# Patient Record
Sex: MALE | Race: WHITE | NOT HISPANIC OR LATINO | Employment: OTHER | URBAN - NONMETROPOLITAN AREA
[De-identification: names, ages, dates, MRNs, and addresses within clinical notes are randomized per-mention and may not be internally consistent; named-entity substitution may affect disease eponyms.]

---

## 2023-02-08 PROBLEM — R73.03 PREDIABETES: Status: ACTIVE | Noted: 2023-02-08

## 2023-02-08 PROBLEM — M48.062 SPINAL STENOSIS OF LUMBAR REGION WITH NEUROGENIC CLAUDICATION: Status: ACTIVE | Noted: 2023-02-08

## 2023-02-08 PROBLEM — I49.3 PVC (PREMATURE VENTRICULAR CONTRACTION): Status: ACTIVE | Noted: 2023-02-08

## 2023-02-08 PROBLEM — H81.10 BPPV (BENIGN PAROXYSMAL POSITIONAL VERTIGO): Status: ACTIVE | Noted: 2023-02-08

## 2023-02-08 PROBLEM — N52.9 ERECTILE DYSFUNCTION: Status: ACTIVE | Noted: 2023-02-08

## 2023-02-08 PROBLEM — Z95.1 S/P CABG X 4: Status: ACTIVE | Noted: 2023-02-08

## 2023-02-08 PROBLEM — D12.6 ADENOMATOUS COLON POLYP: Status: ACTIVE | Noted: 2023-02-08

## 2023-02-08 PROBLEM — K21.9 GERD (GASTROESOPHAGEAL REFLUX DISEASE): Status: ACTIVE | Noted: 2023-02-08

## 2023-02-08 PROBLEM — E78.5 HYPERLIPIDEMIA: Status: ACTIVE | Noted: 2023-02-08

## 2023-02-08 PROBLEM — I25.10 CAD (CORONARY ARTERY DISEASE): Status: ACTIVE | Noted: 2023-02-08

## 2023-02-08 PROBLEM — I10 BENIGN ESSENTIAL HYPERTENSION: Status: ACTIVE | Noted: 2023-02-08

## 2023-02-08 PROBLEM — G47.33 OSA ON CPAP: Status: ACTIVE | Noted: 2023-02-08

## 2023-02-08 PROBLEM — M48.02 CERVICAL STENOSIS OF SPINE: Status: ACTIVE | Noted: 2023-02-08

## 2023-02-08 PROBLEM — R05.3 PERSISTENT COUGH: Status: ACTIVE | Noted: 2023-02-08

## 2023-02-08 RX ORDER — ACETAMINOPHEN 325 MG/1
2 TABLET ORAL EVERY 6 HOURS
COMMUNITY

## 2023-02-08 RX ORDER — TURMERIC ROOT EXTRACT 500 MG
TABLET ORAL
COMMUNITY
Start: 2022-09-26

## 2023-02-08 RX ORDER — SILDENAFIL 100 MG/1
.5-1 TABLET, FILM COATED ORAL
COMMUNITY
Start: 2020-01-31 | End: 2023-03-21 | Stop reason: SDUPTHER

## 2023-02-08 RX ORDER — LOSARTAN POTASSIUM 25 MG/1
1 TABLET ORAL DAILY
COMMUNITY
Start: 2020-06-03 | End: 2023-03-15

## 2023-02-08 RX ORDER — CHLORTHALIDONE 25 MG/1
1 TABLET ORAL DAILY
COMMUNITY
Start: 2020-01-31 | End: 2023-03-15

## 2023-02-08 RX ORDER — ROSUVASTATIN CALCIUM 20 MG/1
0.5 TABLET, COATED ORAL DAILY
COMMUNITY
Start: 2019-03-08 | End: 2023-03-15

## 2023-02-08 RX ORDER — NAPROXEN SODIUM 220 MG/1
TABLET ORAL
COMMUNITY

## 2023-02-08 RX ORDER — FAMOTIDINE 40 MG/1
1 TABLET, FILM COATED ORAL DAILY
COMMUNITY
Start: 2014-03-04 | End: 2023-03-15

## 2023-02-08 RX ORDER — ACETAMINOPHEN 160 MG/5ML
SUSPENSION, ORAL (FINAL DOSE FORM) ORAL
COMMUNITY

## 2023-03-15 DIAGNOSIS — E78.2 MIXED HYPERLIPIDEMIA: ICD-10-CM

## 2023-03-15 DIAGNOSIS — I10 BENIGN ESSENTIAL HYPERTENSION: Primary | ICD-10-CM

## 2023-03-15 DIAGNOSIS — K21.9 GASTROESOPHAGEAL REFLUX DISEASE WITHOUT ESOPHAGITIS: ICD-10-CM

## 2023-03-15 RX ORDER — FAMOTIDINE 40 MG/1
TABLET, FILM COATED ORAL
Qty: 90 TABLET | Refills: 3 | Status: SHIPPED | OUTPATIENT
Start: 2023-03-15 | End: 2024-04-02

## 2023-03-15 RX ORDER — LOSARTAN POTASSIUM 25 MG/1
TABLET ORAL
Qty: 90 TABLET | Refills: 3 | Status: SHIPPED | OUTPATIENT
Start: 2023-03-15 | End: 2024-04-02

## 2023-03-15 RX ORDER — CHLORTHALIDONE 25 MG/1
TABLET ORAL
Qty: 90 TABLET | Refills: 3 | Status: SHIPPED | OUTPATIENT
Start: 2023-03-15 | End: 2024-04-02

## 2023-03-15 RX ORDER — ROSUVASTATIN CALCIUM 20 MG/1
TABLET, COATED ORAL
Qty: 45 TABLET | Refills: 3 | Status: SHIPPED | OUTPATIENT
Start: 2023-03-15 | End: 2024-04-02

## 2023-03-21 ENCOUNTER — OFFICE VISIT (OUTPATIENT)
Dept: PRIMARY CARE | Facility: CLINIC | Age: 73
End: 2023-03-21
Payer: MEDICARE

## 2023-03-21 VITALS
HEIGHT: 64 IN | TEMPERATURE: 98.7 F | BODY MASS INDEX: 33.87 KG/M2 | OXYGEN SATURATION: 95 % | DIASTOLIC BLOOD PRESSURE: 77 MMHG | WEIGHT: 198.4 LBS | SYSTOLIC BLOOD PRESSURE: 130 MMHG | RESPIRATION RATE: 12 BRPM | HEART RATE: 79 BPM

## 2023-03-21 DIAGNOSIS — N52.9 ERECTILE DYSFUNCTION, UNSPECIFIED ERECTILE DYSFUNCTION TYPE: ICD-10-CM

## 2023-03-21 DIAGNOSIS — R73.03 PREDIABETES: ICD-10-CM

## 2023-03-21 DIAGNOSIS — G47.33 OSA ON CPAP: ICD-10-CM

## 2023-03-21 DIAGNOSIS — E78.2 MIXED HYPERLIPIDEMIA: ICD-10-CM

## 2023-03-21 DIAGNOSIS — Z00.00 ROUTINE GENERAL MEDICAL EXAMINATION AT HEALTH CARE FACILITY: Primary | ICD-10-CM

## 2023-03-21 DIAGNOSIS — D12.6 ADENOMATOUS POLYP OF COLON, UNSPECIFIED PART OF COLON: ICD-10-CM

## 2023-03-21 DIAGNOSIS — M62.830 SPASM OF LUMBAR PARASPINOUS MUSCLE: ICD-10-CM

## 2023-03-21 DIAGNOSIS — R06.09 DYSPNEA ON EXERTION: ICD-10-CM

## 2023-03-21 DIAGNOSIS — I25.10 CORONARY ARTERY DISEASE INVOLVING NATIVE HEART, UNSPECIFIED VESSEL OR LESION TYPE, UNSPECIFIED WHETHER ANGINA PRESENT: ICD-10-CM

## 2023-03-21 DIAGNOSIS — K21.9 GASTROESOPHAGEAL REFLUX DISEASE WITHOUT ESOPHAGITIS: ICD-10-CM

## 2023-03-21 DIAGNOSIS — I10 BENIGN ESSENTIAL HYPERTENSION: ICD-10-CM

## 2023-03-21 PROBLEM — R05.3 PERSISTENT COUGH: Status: RESOLVED | Noted: 2023-02-08 | Resolved: 2023-03-21

## 2023-03-21 PROBLEM — H81.10 BPPV (BENIGN PAROXYSMAL POSITIONAL VERTIGO): Status: RESOLVED | Noted: 2023-02-08 | Resolved: 2023-03-21

## 2023-03-21 PROCEDURE — 1160F RVW MEDS BY RX/DR IN RCRD: CPT | Performed by: FAMILY MEDICINE

## 2023-03-21 PROCEDURE — 3075F SYST BP GE 130 - 139MM HG: CPT | Performed by: FAMILY MEDICINE

## 2023-03-21 PROCEDURE — 99214 OFFICE O/P EST MOD 30 MIN: CPT | Performed by: FAMILY MEDICINE

## 2023-03-21 PROCEDURE — G0439 PPPS, SUBSEQ VISIT: HCPCS | Performed by: FAMILY MEDICINE

## 2023-03-21 PROCEDURE — 1157F ADVNC CARE PLAN IN RCRD: CPT | Performed by: FAMILY MEDICINE

## 2023-03-21 PROCEDURE — 3078F DIAST BP <80 MM HG: CPT | Performed by: FAMILY MEDICINE

## 2023-03-21 PROCEDURE — 1159F MED LIST DOCD IN RCRD: CPT | Performed by: FAMILY MEDICINE

## 2023-03-21 PROCEDURE — 1036F TOBACCO NON-USER: CPT | Performed by: FAMILY MEDICINE

## 2023-03-21 PROCEDURE — 1170F FXNL STATUS ASSESSED: CPT | Performed by: FAMILY MEDICINE

## 2023-03-21 RX ORDER — SILDENAFIL 100 MG/1
50-100 TABLET, FILM COATED ORAL AS NEEDED
Qty: 10 TABLET | Refills: 3 | Status: SHIPPED | OUTPATIENT
Start: 2023-03-21 | End: 2024-03-21 | Stop reason: SDUPTHER

## 2023-03-21 ASSESSMENT — ACTIVITIES OF DAILY LIVING (ADL)
MANAGING_FINANCES: INDEPENDENT
DOING_HOUSEWORK: INDEPENDENT
GROCERY_SHOPPING: INDEPENDENT
BATHING: INDEPENDENT
DRESSING: INDEPENDENT
TAKING_MEDICATION: INDEPENDENT

## 2023-03-21 ASSESSMENT — PATIENT HEALTH QUESTIONNAIRE - PHQ9
2. FEELING DOWN, DEPRESSED OR HOPELESS: NOT AT ALL
SUM OF ALL RESPONSES TO PHQ9 QUESTIONS 1 AND 2: 0
1. LITTLE INTEREST OR PLEASURE IN DOING THINGS: NOT AT ALL

## 2023-03-21 NOTE — PROGRESS NOTES
"Subjective   Reason for Visit: Troy Degroot is an 72 y.o. male here for a Medicare Wellness visit.      Flu shot-09/20/2022  COVID shot- Pfizer  Tdap-03/04/2016   Colon Screen- 04/01/2019      Reviewed all medications by prescribing practitioner or clinical pharmacist (such as prescriptions, OTCs, herbal therapies and supplements) and documented in the medical record.    HPI  Patient c/o muscle spasms to high lumbar region. Occurs about 2-3 times per year and lasts about 1 week. No identifiable triggers; seems like onset is random. The spasm has been intermittent over the past several years. He states he was seen by PT. The PT thought it may be residual from previous neck surgery. He has been compliant with home exercises but has not found any long term relief. He did find some relief by rolling tennis ball across the back while pressed against the wall.    Patient states he is going to have stress test per Dr. Dougherty this year. Patient endorses gradual decline in exercise ability with OWUSU. No sudden changes. He walks about 2 miles per day.    Patient Care Team:  Cheo Gleason MD as PCP - General  Cheo Gleason MD as PCP - [employer] Advantage PCP     Review of Systems  constitutional: as per HPI  eyes:as per HPI  CV:as per HPI  Respiratory:as per HPI  GI:as per HPI  neuro:as per HPI  endo:as per HPI  heme/lymph:as per HPI     Objective   Vitals:  /77 (BP Location: Right arm, Patient Position: Sitting, BP Cuff Size: Adult)   Pulse 79   Temp 37.1 °C (98.7 °F) (Temporal)   Resp 12   Ht 1.626 m (5' 4\")   Wt 90 kg (198 lb 6.4 oz)   SpO2 95%   BMI 34.06 kg/m²       Physical Exam  Constitutional:       Appearance: Normal appearance.   Cardiovascular:      Rate and Rhythm: Normal rate and regular rhythm.   Pulmonary:      Effort: Pulmonary effort is normal.      Breath sounds: Normal breath sounds.         Assessment/Plan   Problem List Items Addressed This Visit          Nervous    TORREY on CPAP    " Current Assessment & Plan     Stable.  Continue CPAP nightly.            Circulatory    Benign essential hypertension    Current Assessment & Plan     Hypertension:  Blood pressure in office today was   BP Readings from Last 1 Encounters:   03/21/23 130/77   The goal range for blood pressure is below 130/80.   We will continue to monitor.   Continue losartan and chlorthalidone.            Digestive    Adenomatous colon polyp    Current Assessment & Plan     Polyp identified on colonoscopy on 4/2019; high grade dysplasia.  Will continue to rescreen with periodic colonoscopies.         GERD (gastroesophageal reflux disease)    Current Assessment & Plan     Stable  Continue famotidine 40 mg daily.            Endocrine/Metabolic    Prediabetes    Current Assessment & Plan     Most recent A1c:   Hemoglobin A1C (%)   Date Value   09/15/2022 6.1 (A)     You have pre-diabetic level blood sugar.  This means you are at risk for developing diabetes.  ;  I recommend you avoid processed carbohydrates and sugar containing foods/beverages.  If you must have a sweetened beverage, please use Stevia and avoid artificial sweeteners such as aspartame, sucralose, sweet n low, etc.;DRINK WATER!    A good diet plan to follow is the Mediterranean diet.  Use online search to read more about this.;    Another simple rule is to shop the grocery store perimeter -thus filling your cart with fresh fruits, vegetables, lean meats (fish, chicken); dairy, cheese, and nuts.  Avoid the isles where you find bagged and boxed items that are processed. (chips, crackers, cookies, candies, snacks, etc.);    Aerobic cardiovascular exercise 150min weekly is essential for good health, BP control, sugar metabolism, and weight management.;March 21, 2023            Other    Mixed hyperlipidemia    Current Assessment & Plan     Stable  Continue fish oil and rosuvastatin          Other Visit Diagnoses       Routine general medical examination at [facility]  facility    -  Primary    Will wait to have blood work ordered until appt with cardiologist Dr. Dougherty    Spasm of lumbar paraspinous muscle        Infrequent (2-3 one week episodes per year)  Continue rolling tennis ball along the back.   Apply heat  Drink plenty of fluids as dehydration can cause spasms.               By signing my name below I, leon Angeles, attest that this documentation has been prepared under the direction and in the presence of Dr. Cheo Gleason. 03/21/23

## 2023-03-21 NOTE — ASSESSMENT & PLAN NOTE
Hypertension:  Blood pressure in office today was   BP Readings from Last 1 Encounters:   03/21/23 130/77     The goal range for blood pressure is below 130/80.   We will continue to monitor.   Continue losartan and chlorthalidone.

## 2023-03-21 NOTE — ASSESSMENT & PLAN NOTE
Most recent A1c:   Hemoglobin A1C (%)   Date Value   09/15/2022 6.1 (A)       You have pre-diabetic level blood sugar.  This means you are at risk for developing diabetes.  ;  I recommend you avoid processed carbohydrates and sugar containing foods/beverages.  If you must have a sweetened beverage, please use Stevia and avoid artificial sweeteners such as aspartame, sucralose, sweet n low, etc.;DRINK WATER!    A good diet plan to follow is the Mediterranean diet.  Use online search to read more about this.;    Another simple rule is to shop the grocery store perimeter -thus filling your cart with fresh fruits, vegetables, lean meats (fish, chicken); dairy, cheese, and nuts.  Avoid the isles where you find bagged and boxed items that are processed. (chips, crackers, cookies, candies, snacks, etc.);    Aerobic cardiovascular exercise 150min weekly is essential for good health, BP control, sugar metabolism, and weight management.;March 21, 2023

## 2023-03-21 NOTE — PROGRESS NOTES
"Subjective   Reason for Visit: Troy Degroot is an 72 y.o. male here for a Medicare Wellness visit.          Reviewed all medications by prescribing practitioner or clinical pharmacist (such as prescriptions, OTCs, herbal therapies and supplements) and documented in the medical record.    HPI    Patient Care Team:  Cheo Gleason MD as PCP - General  Cheo Gleason MD as PCP - [employer]na Medicare Advantage PCP     Review of Systems    Objective   Vitals:  /77 (BP Location: Right arm, Patient Position: Sitting, BP Cuff Size: Adult)   Pulse 79   Temp 37.1 °C (98.7 °F) (Temporal)   Resp 12   Ht 1.626 m (5' 4\")   Wt 90 kg (198 lb 6.4 oz)   SpO2 95%   BMI 34.06 kg/m²       Physical Exam    Assessment/Plan   Problem List Items Addressed This Visit          Nervous    TORREY on CPAP    Current Assessment & Plan     Stable.  Continue CPAP nightly.         Relevant Orders    Follow Up In [facility] - PCP       Circulatory    Benign essential hypertension    Current Assessment & Plan     Hypertension:  Blood pressure in office today was   BP Readings from Last 1 Encounters:   03/21/23 130/77   The goal range for blood pressure is below 130/80.   We will continue to monitor.   Continue losartan and chlorthalidone.         Relevant Orders    Follow Up [facility] - PCP    CAD (coronary artery disease)    Relevant Medications    sildenafil (Viagra) 100 mg tablet    Other Relevant Orders    Nuclear Stress Test       Digestive    Adenomatous colon polyp    Current Assessment & Plan     Polyp identified on colonoscopy on 4/2019; high grade dysplasia.  Due for repeat scope 2024 as 5 year interval.         GERD (gastroesophageal reflux disease)    Current Assessment & Plan     Stable  Continue famotidine 40 mg daily.         Relevant Orders    Follow Up [facility] - PCP       Genitourinary    Erectile dysfunction    Relevant Medications    sildenafil (Viagra) 100 mg tablet    Other Relevant " Orders    Follow Up [facility] - PCP       Endocrine/Metabolic    Prediabetes    Current Assessment & Plan     Most recent A1c:   Hemoglobin A1C (%)   Date Value   09/15/2022 6.1 (A)     You have pre-diabetic level blood sugar.  This means you are at risk for developing diabetes.  ;  I recommend you avoid processed carbohydrates and sugar containing foods/beverages.  If you must have a sweetened beverage, please use Stevia and avoid artificial sweeteners such as aspartame, sucralose, sweet n low, etc.;DRINK WATER!    A good diet plan to follow is the Mediterranean diet.  Use online search to read more about this.;    Another simple rule is to shop the grocery store perimeter -thus filling your cart with fresh fruits, vegetables, lean meats (fish, chicken); dairy, cheese, and nuts.  Avoid the isles where you find bagged and boxed items that are processed. (chips, crackers, cookies, candies, snacks, etc.);    Aerobic cardiovascular exercise 150min weekly is essential for good health, BP control, sugar metabolism, and weight management.;March 21, 2023         Relevant Orders    Follow Up [facility] - PCP       Other    Mixed hyperlipidemia    Current Assessment & Plan     Stable  Continue fish oil and rosuvastatin         Relevant Orders    Follow Up [facility] - PCP     Other Visit Diagnoses       Routine general medical examination at health care facility    -  Primary    Will wait to have blood work ordered until appt with cardiologist Dr. Dougherty    Spasm of lumbar paraspinous muscle        Infrequent (2-3 one week episodes per year)  Continue rolling tennis ball along the back.   Apply heat  Drink plenty of fluids as dehydration can cause spasms.    Dyspnea on exertion        Due for stress test in 6 months per Dr. Dougherty.  However given symptoms of increased OWUSU, ordered stress test to be done in the near future.    Relevant Orders    Nuclear Stress Test

## 2023-03-21 NOTE — ASSESSMENT & PLAN NOTE
Polyp identified on colonoscopy on 4/2019; high grade dysplasia.  Due for repeat scope 2024 as 5 year interval.

## 2023-08-28 ENCOUNTER — TELEPHONE (OUTPATIENT)
Dept: PRIMARY CARE | Facility: CLINIC | Age: 73
End: 2023-08-28
Payer: MEDICARE

## 2023-08-28 DIAGNOSIS — D12.6 ADENOMATOUS POLYP OF COLON, UNSPECIFIED PART OF COLON: ICD-10-CM

## 2023-08-28 DIAGNOSIS — R73.03 PREDIABETES: ICD-10-CM

## 2023-08-28 DIAGNOSIS — Z12.5 PROSTATE CANCER SCREENING: Primary | ICD-10-CM

## 2023-08-28 DIAGNOSIS — K21.9 GASTROESOPHAGEAL REFLUX DISEASE WITHOUT ESOPHAGITIS: ICD-10-CM

## 2023-08-28 DIAGNOSIS — E78.2 MIXED HYPERLIPIDEMIA: ICD-10-CM

## 2023-08-28 DIAGNOSIS — Z11.59 NEED FOR HEPATITIS C SCREENING TEST: ICD-10-CM

## 2023-08-28 NOTE — TELEPHONE ENCOUNTER
The patient is calling to request his yearly labs be placed to his prior to his appointment coming up.  He would like to have these done with his labs from Dr. Dougherty.

## 2023-08-30 ENCOUNTER — LAB (OUTPATIENT)
Dept: LAB | Facility: LAB | Age: 73
End: 2023-08-30
Payer: MEDICARE

## 2023-08-30 DIAGNOSIS — D12.6 ADENOMATOUS POLYP OF COLON, UNSPECIFIED PART OF COLON: ICD-10-CM

## 2023-08-30 DIAGNOSIS — Z11.59 NEED FOR HEPATITIS C SCREENING TEST: ICD-10-CM

## 2023-08-30 DIAGNOSIS — Z12.5 PROSTATE CANCER SCREENING: ICD-10-CM

## 2023-08-30 DIAGNOSIS — K21.9 GASTROESOPHAGEAL REFLUX DISEASE WITHOUT ESOPHAGITIS: ICD-10-CM

## 2023-08-30 DIAGNOSIS — E78.2 MIXED HYPERLIPIDEMIA: ICD-10-CM

## 2023-08-30 DIAGNOSIS — R73.03 PREDIABETES: ICD-10-CM

## 2023-08-30 LAB
ALANINE AMINOTRANSFERASE (SGPT) (U/L) IN SER/PLAS: 36 U/L (ref 10–52)
ALBUMIN (G/DL) IN SER/PLAS: 4.6 G/DL (ref 3.4–5)
ALKALINE PHOSPHATASE (U/L) IN SER/PLAS: 61 U/L (ref 33–136)
ANION GAP IN SER/PLAS: 16 MMOL/L (ref 10–20)
ASPARTATE AMINOTRANSFERASE (SGOT) (U/L) IN SER/PLAS: 29 U/L (ref 9–39)
BASOPHILS (10*3/UL) IN BLOOD BY AUTOMATED COUNT: 0.07 X10E9/L (ref 0–0.1)
BASOPHILS/100 LEUKOCYTES IN BLOOD BY AUTOMATED COUNT: 0.9 % (ref 0–2)
BILIRUBIN TOTAL (MG/DL) IN SER/PLAS: 0.7 MG/DL (ref 0–1.2)
CALCIUM (MG/DL) IN SER/PLAS: 9.6 MG/DL (ref 8.6–10.6)
CARBON DIOXIDE, TOTAL (MMOL/L) IN SER/PLAS: 29 MMOL/L (ref 21–32)
CHLORIDE (MMOL/L) IN SER/PLAS: 100 MMOL/L (ref 98–107)
CHOLESTEROL (MG/DL) IN SER/PLAS: 133 MG/DL (ref 0–199)
CHOLESTEROL IN HDL (MG/DL) IN SER/PLAS: 55 MG/DL
CHOLESTEROL/HDL RATIO: 2.4
CREATININE (MG/DL) IN SER/PLAS: 1.01 MG/DL (ref 0.5–1.3)
EOSINOPHILS (10*3/UL) IN BLOOD BY AUTOMATED COUNT: 0.27 X10E9/L (ref 0–0.4)
EOSINOPHILS/100 LEUKOCYTES IN BLOOD BY AUTOMATED COUNT: 3.4 % (ref 0–6)
ERYTHROCYTE DISTRIBUTION WIDTH (RATIO) BY AUTOMATED COUNT: 13.3 % (ref 11.5–14.5)
ERYTHROCYTE MEAN CORPUSCULAR HEMOGLOBIN CONCENTRATION (G/DL) BY AUTOMATED: 33.9 G/DL (ref 32–36)
ERYTHROCYTE MEAN CORPUSCULAR VOLUME (FL) BY AUTOMATED COUNT: 93 FL (ref 80–100)
ERYTHROCYTES (10*6/UL) IN BLOOD BY AUTOMATED COUNT: 4.83 X10E12/L (ref 4.5–5.9)
ESTIMATED AVERAGE GLUCOSE FOR HBA1C: 128 MG/DL
GFR MALE: 79 ML/MIN/1.73M2
GLUCOSE (MG/DL) IN SER/PLAS: 105 MG/DL (ref 74–99)
HEMATOCRIT (%) IN BLOOD BY AUTOMATED COUNT: 44.9 % (ref 41–52)
HEMOGLOBIN (G/DL) IN BLOOD: 15.2 G/DL (ref 13.5–17.5)
HEMOGLOBIN A1C/HEMOGLOBIN TOTAL IN BLOOD: 6.1 %
HEPATITIS C VIRUS AB PRESENCE IN SERUM: NONREACTIVE
IMMATURE GRANULOCYTES/100 LEUKOCYTES IN BLOOD BY AUTOMATED COUNT: 0.4 % (ref 0–0.9)
LDL: 56 MG/DL (ref 0–99)
LEUKOCYTES (10*3/UL) IN BLOOD BY AUTOMATED COUNT: 7.8 X10E9/L (ref 4.4–11.3)
LYMPHOCYTES (10*3/UL) IN BLOOD BY AUTOMATED COUNT: 2.07 X10E9/L (ref 0.8–3)
LYMPHOCYTES/100 LEUKOCYTES IN BLOOD BY AUTOMATED COUNT: 26.4 % (ref 13–44)
MONOCYTES (10*3/UL) IN BLOOD BY AUTOMATED COUNT: 0.68 X10E9/L (ref 0.05–0.8)
MONOCYTES/100 LEUKOCYTES IN BLOOD BY AUTOMATED COUNT: 8.7 % (ref 2–10)
NEUTROPHILS (10*3/UL) IN BLOOD BY AUTOMATED COUNT: 4.71 X10E9/L (ref 1.6–5.5)
NEUTROPHILS/100 LEUKOCYTES IN BLOOD BY AUTOMATED COUNT: 60.2 % (ref 40–80)
NRBC (PER 100 WBCS) BY AUTOMATED COUNT: 0 /100 WBC (ref 0–0)
PLATELETS (10*3/UL) IN BLOOD AUTOMATED COUNT: 221 X10E9/L (ref 150–450)
POTASSIUM (MMOL/L) IN SER/PLAS: 3.8 MMOL/L (ref 3.5–5.3)
PROSTATE SPECIFIC ANTIGEN,SCREEN: 0.65 NG/ML (ref 0–4)
PROTEIN TOTAL: 7.1 G/DL (ref 6.4–8.2)
SODIUM (MMOL/L) IN SER/PLAS: 141 MMOL/L (ref 136–145)
TRIGLYCERIDE (MG/DL) IN SER/PLAS: 109 MG/DL (ref 0–149)
UREA NITROGEN (MG/DL) IN SER/PLAS: 19 MG/DL (ref 6–23)
VLDL: 22 MG/DL (ref 0–40)

## 2023-08-30 PROCEDURE — 80061 LIPID PANEL: CPT

## 2023-08-30 PROCEDURE — G0103 PSA SCREENING: HCPCS

## 2023-08-30 PROCEDURE — 83036 HEMOGLOBIN GLYCOSYLATED A1C: CPT

## 2023-08-30 PROCEDURE — 85025 COMPLETE CBC W/AUTO DIFF WBC: CPT

## 2023-08-30 PROCEDURE — 36415 COLL VENOUS BLD VENIPUNCTURE: CPT

## 2023-08-30 PROCEDURE — 86803 HEPATITIS C AB TEST: CPT

## 2023-08-30 PROCEDURE — 80053 COMPREHEN METABOLIC PANEL: CPT

## 2023-09-21 ENCOUNTER — OFFICE VISIT (OUTPATIENT)
Dept: PRIMARY CARE | Facility: CLINIC | Age: 73
End: 2023-09-21
Payer: MEDICARE

## 2023-09-21 VITALS
OXYGEN SATURATION: 93 % | RESPIRATION RATE: 14 BRPM | DIASTOLIC BLOOD PRESSURE: 76 MMHG | SYSTOLIC BLOOD PRESSURE: 125 MMHG | HEART RATE: 76 BPM | TEMPERATURE: 97.1 F | BODY MASS INDEX: 34.16 KG/M2 | WEIGHT: 199 LBS

## 2023-09-21 DIAGNOSIS — N52.9 ERECTILE DYSFUNCTION, UNSPECIFIED ERECTILE DYSFUNCTION TYPE: ICD-10-CM

## 2023-09-21 DIAGNOSIS — G47.33 OSA ON CPAP: ICD-10-CM

## 2023-09-21 DIAGNOSIS — Z95.1 S/P CABG X 4: Primary | ICD-10-CM

## 2023-09-21 DIAGNOSIS — I10 BENIGN ESSENTIAL HYPERTENSION: ICD-10-CM

## 2023-09-21 DIAGNOSIS — K21.9 GASTROESOPHAGEAL REFLUX DISEASE WITHOUT ESOPHAGITIS: ICD-10-CM

## 2023-09-21 DIAGNOSIS — E78.2 MIXED HYPERLIPIDEMIA: ICD-10-CM

## 2023-09-21 DIAGNOSIS — D12.6 ADENOMATOUS POLYP OF COLON, UNSPECIFIED PART OF COLON: ICD-10-CM

## 2023-09-21 DIAGNOSIS — R73.03 PREDIABETES: ICD-10-CM

## 2023-09-21 PROCEDURE — 99214 OFFICE O/P EST MOD 30 MIN: CPT | Performed by: FAMILY MEDICINE

## 2023-09-21 PROCEDURE — 1036F TOBACCO NON-USER: CPT | Performed by: FAMILY MEDICINE

## 2023-09-21 PROCEDURE — 1160F RVW MEDS BY RX/DR IN RCRD: CPT | Performed by: FAMILY MEDICINE

## 2023-09-21 PROCEDURE — 3078F DIAST BP <80 MM HG: CPT | Performed by: FAMILY MEDICINE

## 2023-09-21 PROCEDURE — 1159F MED LIST DOCD IN RCRD: CPT | Performed by: FAMILY MEDICINE

## 2023-09-21 PROCEDURE — 3074F SYST BP LT 130 MM HG: CPT | Performed by: FAMILY MEDICINE

## 2023-09-21 NOTE — PROGRESS NOTES
Subjective     Patient ID: Mao Degroot is a 72 y.o. male who presents for follow up of chronic medical conditions.      Tdap: 03/04/2016  Colonoscopy: 03/29/2019  Flu shot will be done at pharmacy    Started taking Zyrtec due to his sinuses running a lot.  Unsure if he should be taking that or not.      High blood pressure evaluation.     Review of symptoms:  Fatigue:  N   Headaches: N  Blurred vision:  N  Palpitations: N  Chest pains: N  Shortness of Breath: N  Swelling of legs: N  Blood in urine: N  Taking medications daily: Y   Medication side effects: N   Problems with medication compliance:N  Baby Aspirin 81 mg daily: Y     High cholesterol evaluation.     Supplements: Y   specific diet plan:  exercising 30 min daily?:    Fatigue:N  Chest pains:N  Shortness of Breath:N  Sudden Headaches: N  Vision loss: N  Syncope (fainting): N  Leg Claudication (limping/leg tiredness): N   Taking medication daily: Y  Medication side effects:N      May be allergic to dogs  Has congestion and zyrtec helps        Review of Systems    Objective   There were no vitals taken for this visit.  Physical Exam  Constitutional:       Appearance: Normal appearance.   Cardiovascular:      Rate and Rhythm: Normal rate and regular rhythm.      Pulses: Normal pulses.      Heart sounds: Normal heart sounds.   Pulmonary:      Effort: Pulmonary effort is normal.      Breath sounds: Normal breath sounds.   Neurological:      General: No focal deficit present.      Mental Status: He is alert.   Psychiatric:         Mood and Affect: Mood normal.         Behavior: Behavior normal.         Thought Content: Thought content normal.         Judgment: Judgment normal.         Assessment/Plan   Problem List Items Addressed This Visit       Benign essential hypertension    Erectile dysfunction    GERD (gastroesophageal reflux disease)    Mixed hyperlipidemia    TORREY on CPAP    Prediabetes

## 2023-09-25 NOTE — ASSESSMENT & PLAN NOTE
Most recent A1c:   Hemoglobin A1C (%)   Date Value   08/30/2023 6.1 (A)     Weight loss needed    You have pre-diabetic level blood sugar.  This means you are at risk for developing diabetes.  ;  I recommend you avoid processed carbohydrates and sugar containing foods/beverages.  If you must have a sweetened beverage, please use Stevia and avoid artificial sweeteners such as aspartame, sucralose, sweet n low, etc.;DRINK WATER!    A good diet plan to follow is the Mediterranean diet.  Use online search to read more about this.;    Another simple rule is to shop the grocery store perimeter -thus filling your cart with fresh fruits, vegetables, lean meats (fish, chicken); dairy, cheese, and nuts.  Avoid the isles where you find bagged and boxed items that are processed. (chips, crackers, cookies, candies, snacks, etc.);    Aerobic cardiovascular exercise 150min weekly is essential for good health, BP control, sugar metabolism, and weight management.;September 25, 2023

## 2023-09-25 NOTE — ASSESSMENT & PLAN NOTE
Hypertension:  Blood pressure in office today was   BP Readings from Last 1 Encounters:   09/21/23 125/76     The goal range for blood pressure is below 130/80.   We will continue to monitor.   Continue losartan and chlorthalidone.

## 2023-09-25 NOTE — ASSESSMENT & PLAN NOTE
bypass April 2019 consisting of a LIMA to the LAD, SVG to the diagonal, marginal, and PDA.     Monitored by cardiolgy Dr Ladonna Monge  No anginal symptoms

## 2023-11-19 NOTE — PROGRESS NOTES
Subjective   Patient ID: Mao Degroot is a 72 y.o. male who presents for Back Pain (Pt is having muscle spasms in back /Already had the flu shot ).    HPI     PCP- Victorino    The patient presents for evaluation of back pain. Muscle spasms chronic for years.  No new injuries or falls.    Hx C4-7 ACDF with Dr. Dale.  Also had low back fusion about 2 years ago.    Concern today is spasms   Location: right mid to low back   Pain quality: spasms   Severity: 10/10 at worst, now 2/10- worse with movement   Has done PT, hasn't helped much; interested in medical massage   Has used flexeril in past; declines as makes him sleepy    Radiation: none   Timing: intermittent   Duration: 40 years   Previous imaging: xray 2021-  Cervical spine: ACDF C4-C7 without hardware complication.  Lumbar spine: Lumbar spine degenerative changes that are most pronounced at L4-5 and L5-S1.  MRI L spine 2021 stenosis and severe left greater than right neural foraminal narrowing. Degenerative changes are most severe in the lumbar spine at L4-L5 where there is a broad-based disc bulge with the right subarticular zone herniation causing severe narrowing of the central canal within impingement on the traversing L5 nerve roots. There is also severe bilateral neural foraminal narrowing.  Treatments tried include: flexeril, medical marijuana   Denies red flag symptoms including fevers, chills, weight loss, saddle anesthesia, loss of control of bowels/bladder, history of cancer, osteoporosis, severe trauma   No abd pain, urinary, or GI symptoms   No paresthesias or weakness in extremities      Review of Systems   Constitutional:  Negative for chills, fatigue and fever.   Respiratory:  Negative for cough and shortness of breath.    Cardiovascular:  Negative for chest pain and palpitations.       Objective   /79 (BP Location: Left arm, Patient Position: Sitting, BP Cuff Size: Large adult)   Pulse 84   Temp 36.6 °C (97.8 °F) (Temporal)   Resp  16   Wt 91.7 kg (202 lb 1.6 oz)   SpO2 96%   BMI 34.69 kg/m²     Physical Exam    Constitutional: Well developed, well nourished, alert and in no acute distress   Eyes: Normal external exam.   LUMBAR SPINE: Midline scar noted.  No erythema, warmth, or swelling noted. +Right paraspinal muscle spasm. No spinal tenderness. Normal range of motion to flexion, extension, right side bending, left side bending, right rotation, left rotation. Muscle strength +5/5 to hip flexion, knee extension, foot dorsiflexion, foot plantarflexion, and EHL bilaterally (L2-S1). Sensation of lower extremities intact. Negative seated straight leg raise. DTR +1/4. Normal distal pulses.  Skin: Warm, well perfused, normal skin turgor and color.   Neurologic: Cranial nerves II-XII grossly intact.   Psychiatric: Mood calm and affect normal.      Assessment/Plan   Problem List Items Addressed This Visit    None  Visit Diagnoses         Codes    Chronic right-sided low back pain without sciatica    -  Primary M54.50, G89.29    Relevant Orders    Referral to [facility]    Muscle spasm of back     M62.830    Relevant Orders    Referral to [facility]    History of back surgery     Z98.890    Relevant Orders    Referral to [facility]             He declines need for prescription medication.   He is interested in medical massage.  He has already done physical therapy.   Advised him to see medical spine specialist if needed as next step.     Mary Ellen Benton, DO  11/20/2023

## 2023-11-20 ENCOUNTER — OFFICE VISIT (OUTPATIENT)
Dept: PRIMARY CARE | Facility: CLINIC | Age: 73
End: 2023-11-20
Payer: MEDICARE

## 2023-11-20 VITALS
OXYGEN SATURATION: 96 % | DIASTOLIC BLOOD PRESSURE: 79 MMHG | RESPIRATION RATE: 16 BRPM | HEART RATE: 84 BPM | TEMPERATURE: 97.8 F | WEIGHT: 202.1 LBS | BODY MASS INDEX: 34.69 KG/M2 | SYSTOLIC BLOOD PRESSURE: 151 MMHG

## 2023-11-20 DIAGNOSIS — G89.29 CHRONIC RIGHT-SIDED LOW BACK PAIN WITHOUT SCIATICA: Primary | ICD-10-CM

## 2023-11-20 DIAGNOSIS — M54.50 CHRONIC RIGHT-SIDED LOW BACK PAIN WITHOUT SCIATICA: Primary | ICD-10-CM

## 2023-11-20 DIAGNOSIS — M62.830 MUSCLE SPASM OF BACK: ICD-10-CM

## 2023-11-20 DIAGNOSIS — Z98.890 HISTORY OF BACK SURGERY: ICD-10-CM

## 2023-11-20 PROCEDURE — 3078F DIAST BP <80 MM HG: CPT | Performed by: FAMILY MEDICINE

## 2023-11-20 PROCEDURE — 3077F SYST BP >= 140 MM HG: CPT | Performed by: FAMILY MEDICINE

## 2023-11-20 PROCEDURE — 99213 OFFICE O/P EST LOW 20 MIN: CPT | Performed by: FAMILY MEDICINE

## 2023-11-20 PROCEDURE — 1036F TOBACCO NON-USER: CPT | Performed by: FAMILY MEDICINE

## 2023-11-20 PROCEDURE — 1159F MED LIST DOCD IN RCRD: CPT | Performed by: FAMILY MEDICINE

## 2023-11-20 PROCEDURE — 1160F RVW MEDS BY RX/DR IN RCRD: CPT | Performed by: FAMILY MEDICINE

## 2023-11-20 ASSESSMENT — ENCOUNTER SYMPTOMS
FEVER: 0
PALPITATIONS: 0
FATIGUE: 0
SHORTNESS OF BREATH: 0
COUGH: 0
CHILLS: 0

## 2023-11-21 ENCOUNTER — APPOINTMENT (OUTPATIENT)
Dept: INTEGRATIVE MEDICINE | Facility: CLINIC | Age: 73
End: 2023-11-21
Payer: MEDICARE

## 2023-11-24 ENCOUNTER — TELEPHONE (OUTPATIENT)
Dept: PRIMARY CARE | Facility: CLINIC | Age: 73
End: 2023-11-24
Payer: MEDICARE

## 2023-11-24 DIAGNOSIS — G89.29 CHRONIC RIGHT-SIDED LOW BACK PAIN WITHOUT SCIATICA: Primary | ICD-10-CM

## 2023-11-24 DIAGNOSIS — M54.50 CHRONIC RIGHT-SIDED LOW BACK PAIN WITHOUT SCIATICA: Primary | ICD-10-CM

## 2023-11-24 RX ORDER — CYCLOBENZAPRINE HCL 5 MG
5 TABLET ORAL 3 TIMES DAILY PRN
Qty: 30 TABLET | Refills: 0 | Status: SHIPPED | OUTPATIENT
Start: 2023-11-24 | End: 2024-01-23

## 2023-11-24 NOTE — TELEPHONE ENCOUNTER
Patient saw you 11/20/23 for muscle pain    During the visit, he declined muscle relaxer due to them making him sleepy, he was under the impression he would have medical massage not long after appointment    He is unable to get in for the massage for a month and would like flexeril to get him through until then     He uses [facility] in [place]

## 2023-12-24 SDOH — SOCIAL STABILITY: SOCIAL NETWORK: I HAVE TROUBLE DOING ALL OF THE FAMILY ACTIVITIES THAT I WANT TO DO: RARELY

## 2023-12-24 SDOH — SOCIAL STABILITY: SOCIAL NETWORK: I HAVE TROUBLE DOING ALL OF MY USUAL WORK (INCLUDE WORK AT HOME): SOMETIMES

## 2023-12-24 SDOH — SOCIAL STABILITY: SOCIAL NETWORK: I HAVE TROUBLE DOING ALL OF THE ACTIVITIES WITH FRIENDS THAT I WANT TO DO: RARELY

## 2023-12-24 SDOH — SOCIAL STABILITY: SOCIAL NETWORK: I HAVE TROUBLE DOING ALL OF MY REGULAR LEISURE ACTIVITIES WITH OTHERS: SOMETIMES

## 2023-12-24 SDOH — SOCIAL STABILITY: SOCIAL NETWORK: PROMIS ABILITY TO PARTICIPATE IN SOCIAL ROLES & ACTIVITIES T-SCORE: 48

## 2023-12-24 SDOH — SOCIAL STABILITY: SOCIAL NETWORK

## 2023-12-26 ENCOUNTER — ALLIED HEALTH (OUTPATIENT)
Dept: INTEGRATIVE MEDICINE | Facility: CLINIC | Age: 73
End: 2023-12-26

## 2023-12-26 DIAGNOSIS — G89.29 CHRONIC RIGHT-SIDED LOW BACK PAIN WITHOUT SCIATICA: Primary | ICD-10-CM

## 2023-12-26 DIAGNOSIS — M54.50 CHRONIC RIGHT-SIDED LOW BACK PAIN WITHOUT SCIATICA: Primary | ICD-10-CM

## 2023-12-26 DIAGNOSIS — Z98.890 HISTORY OF BACK SURGERY: ICD-10-CM

## 2023-12-26 PROCEDURE — MASSG60 MASSAGE 60 MINUTES: Performed by: MASSAGE THERAPIST

## 2023-12-26 ASSESSMENT — PAIN SCALES - GENERAL: PAINLEVEL_OUTOF10: 0 - NO PAIN

## 2023-12-26 ASSESSMENT — PAIN DESCRIPTION - DESCRIPTORS: DESCRIPTORS: DULL

## 2023-12-26 NOTE — PROGRESS NOTES
Condition of Client (C)   Initial Visit  Patient arrived ambulatory, steady gait, no assistive devices  Identified Patient via two identifiers: name and date of birth  No signs or symptoms of  COVID-19 noted per patient today  Fall Risk: patient did not need assistance to get on or position on the table  Denies allergy to topical lubricant/nuts/seeds/fruits  Reviewed diagnoses, problem list. Medications, platelet level  No contraindications to massage precautions to massage include lighter pressure massage techniques due to history of stenosis of the cervical and lumbar spine, history of cervical fusion, use of Ibuprofen, last Platelet Level: 221 on 08.30.2023  Chief Complaint(S): constant, dull pain in the entire lower back and neck rated 5/10, muscle tension-spasms in the right posterior mid-thoracic region rated 5/10  Exacerbating Factors: lying on floor and not able to get up/sudden movements or bumping into something  Alleviating Factors: muscle tension and pain: thc gummies and cbd/thc cream/physical therapy to the neck/upper body  Patient Goals/Intention: promote comfort and relief of pain/relaxation  Patient Preferences: lighter, firm pressure/swedish massage with therapy on areas of concern  Patient Noted: lower back pain for 40 years, happened at work due to stress, noted spouse rubs his back/he has received one professional massage in the past/experienced 4 spinal surgeries: 2 neck and 2 lower back surgeries/purpose and errol: traveling, trips, and hiking, good relationship with spouse, nice life/constant, dull pain in the entire lower back and neck rated 5/10, the intensity of the pain varies/muscle tension-spasms in the right mid-thoracic region rated 5/10/denied anxiety and stress/denied fatigue, but naps in the afternoon, noted he takes multiple medications/coping rated 10/10/wellbeing rated 10/10     Actions (A)  Provider reviewed plan for the massage session, no contraindications and precautions  include: lighter pressure massage due to diagnoses of lumbar stenosis, history of spinal surgeries to neck (2) and lower back (2), and medications: muscle relaxant, platelets 221 on 08.30,2023 - no new labs documented, and patient has given consent to treat with full understanding of what to expect during the session.  Before massage therapy began, provider explained to the patient to communicate at any time if the pressure was causing discomfort past their tolerance level. Patient agreed to advise therapist.  Massage Pressure Scale: 0-2/5   50 minute session: Gambian Relaxation, upper body massage with focus to areas of concern: neck, mid-thoracic and lower back  Supine Position: lighter pressure Swedish Massage to the head and ears to promote relaxation, posterolateral neck, shoulders and upper back, using the body as counter pressure/light stripping to the pectoralis minor, scalene and levator scapulae muscles  Prone: superficial fascial release to the posterior neck, shoulders, and back with focus to the right midthoracic and lumbar region/lighter pressure stripping and cross-fiber friction to the lumbar region vertical surgical scar (well-healed scar)  Used bolster under knees while supine/bolster under ankles while prone  Used [employer] Advanced Therapy Lotion  Used heated table pad, set to patient's comfort preference  Soft, relaxing music    Response (R)  Patient noted improvement in symptoms: constant, dull pain in the entire lower back rated 0/10 decreased 5 levels and resolved and in the neck rated 2/10, decreased 3 levels/muscle tension-spasms in the right mid-thoracic region rated 3/10, decreased 2 levels/coping rated 10/10, no change in rating/wellbeing rated 10/10 , no change in rating  Patient's Subjective Pressure Rating: comfortable and relaxing  Patient noted a sense of well-being    Evaluation (E):  Patient independent to get off massage table.  Recommendation: 50 minute massage as she can  schedule  Advised that acupuncture treatments would be beneficial - will consider this therapy in the future per his report  Advised that practicing ROM to the postural muscles during the workday help blood circulation and decrease tension and encourage waste products of metabolism to move toward organs of elimination  2.   Advised that self-massage of scalp/ears can promote relaxation and extend the benefits of the massage therapy session  Appointment scheduled for 2023: would like to continue massage therapy  Patient prefers relaxation massage with therapeutic massage to defined areas.  Patient gave permission to send Home Self-Care Practice documents via: [email or web address]  Self-Care Home Practices include: [facility] Guided Imagery links/Gentle Range of Motion and Movement/Habit Stacking/Heart Centered Meditation/Mindful Breathin-2-6 and Anchoring Affirmations/Positional Releases: Scalenes, Levator Scapulae, Upper Trapezius, Splenius, and Quadratus Lumborum/Progressive Relaxation  Provided a business card so patient can contact therapist with any concerns or questions about the treatment protocol

## 2024-01-05 ENCOUNTER — TELEPHONE (OUTPATIENT)
Dept: PRIMARY CARE | Facility: CLINIC | Age: 74
End: 2024-01-05
Payer: MEDICARE

## 2024-01-05 DIAGNOSIS — G89.29 CHRONIC LOW BACK PAIN, UNSPECIFIED BACK PAIN LATERALITY, UNSPECIFIED WHETHER SCIATICA PRESENT: Primary | ICD-10-CM

## 2024-01-05 DIAGNOSIS — M54.50 CHRONIC LOW BACK PAIN, UNSPECIFIED BACK PAIN LATERALITY, UNSPECIFIED WHETHER SCIATICA PRESENT: Primary | ICD-10-CM

## 2024-01-05 RX ORDER — METHYLPREDNISOLONE 4 MG/1
TABLET ORAL
Qty: 21 TABLET | Refills: 0 | Status: SHIPPED | OUTPATIENT
Start: 2024-01-05 | End: 2024-01-12

## 2024-01-05 NOTE — TELEPHONE ENCOUNTER
Patient was seen a few times for lower back pain    States PT did not help, medical massage did not improve symptoms    He is asking if you think prednisone would be helpful again (states he had a similar issue years ago and you prescribed this)     Sending to covering since Dr. Gleason is out of office until Wednesday     Per note from 3/5/2018:  Right-sided low back pain with sciatica    · Start: PredniSONE 5 MG Oral Tablet; as direxcted by written tapering instructions over  next 14days   Rx By: Cheo Gleason; Dispense: 0 Days ; #:100 Tablet; Refill: 0; For: Right-sided low back pain with sciatica; KEVIN = N; Verified Transmission to [employer]; Last Updated By: System, [employer]; 3/5/2018 11:52:08 AM

## 2024-01-06 NOTE — TELEPHONE ENCOUNTER
We can try prednisone, unsure if it will be as helpful since it appears this is a chronic issue - usually works better in more acute (newer) issues with back pain, but okay to try. Will prescribe a Medrol Dose pack - [facility] local pharmacy on file.

## 2024-01-08 NOTE — TELEPHONE ENCOUNTER
Spoke with patient, he picked up script on Saturday,   Feeling a lot better now due to prednisone

## 2024-01-16 ENCOUNTER — ALLIED HEALTH (OUTPATIENT)
Dept: INTEGRATIVE MEDICINE | Facility: CLINIC | Age: 74
End: 2024-01-16

## 2024-01-16 PROCEDURE — MASS60G MASSAGE 60 MINUTES: Performed by: MASSAGE THERAPIST

## 2024-01-16 ASSESSMENT — PAIN DESCRIPTION - DESCRIPTORS: DESCRIPTORS: OTHER (COMMENT)

## 2024-01-16 ASSESSMENT — PAIN SCALES - GENERAL: PAINLEVEL_OUTOF10: 0 - NO PAIN

## 2024-01-17 ENCOUNTER — OFFICE VISIT (OUTPATIENT)
Dept: PRIMARY CARE | Facility: CLINIC | Age: 74
End: 2024-01-17
Payer: MEDICARE

## 2024-01-17 ENCOUNTER — ANCILLARY PROCEDURE (OUTPATIENT)
Dept: RADIOLOGY | Facility: CLINIC | Age: 74
End: 2024-01-17
Payer: MEDICARE

## 2024-01-17 VITALS
WEIGHT: 200.2 LBS | RESPIRATION RATE: 14 BRPM | HEART RATE: 69 BPM | OXYGEN SATURATION: 96 % | BODY MASS INDEX: 34.36 KG/M2 | DIASTOLIC BLOOD PRESSURE: 74 MMHG | TEMPERATURE: 97.7 F | SYSTOLIC BLOOD PRESSURE: 119 MMHG

## 2024-01-17 DIAGNOSIS — M62.830 SPASM OF MUSCLE OF LOWER BACK: Primary | ICD-10-CM

## 2024-01-17 DIAGNOSIS — M62.830 SPASM OF MUSCLE OF LOWER BACK: ICD-10-CM

## 2024-01-17 PROCEDURE — 1160F RVW MEDS BY RX/DR IN RCRD: CPT | Performed by: FAMILY MEDICINE

## 2024-01-17 PROCEDURE — 1036F TOBACCO NON-USER: CPT | Performed by: FAMILY MEDICINE

## 2024-01-17 PROCEDURE — 3074F SYST BP LT 130 MM HG: CPT | Performed by: FAMILY MEDICINE

## 2024-01-17 PROCEDURE — 99213 OFFICE O/P EST LOW 20 MIN: CPT | Performed by: FAMILY MEDICINE

## 2024-01-17 PROCEDURE — 3078F DIAST BP <80 MM HG: CPT | Performed by: FAMILY MEDICINE

## 2024-01-17 PROCEDURE — 1159F MED LIST DOCD IN RCRD: CPT | Performed by: FAMILY MEDICINE

## 2024-01-17 PROCEDURE — 72072 X-RAY EXAM THORAC SPINE 3VWS: CPT | Performed by: RADIOLOGY

## 2024-01-17 PROCEDURE — 72072 X-RAY EXAM THORAC SPINE 3VWS: CPT

## 2024-01-17 PROCEDURE — 1126F AMNT PAIN NOTED NONE PRSNT: CPT | Performed by: FAMILY MEDICINE

## 2024-01-17 ASSESSMENT — ENCOUNTER SYMPTOMS: BACK PAIN: 1

## 2024-01-17 NOTE — ASSESSMENT & PLAN NOTE
Symptoms ongoing for several months, thoracic region.  History of lumbar and cervical back surgeries.  Order for x-ray placed.  Recommend ice, moist heat (20 minutes max), ice again as needed.  If xrays not show concern, will recommend chiropractic treatment and ongoing massotherapy in coordination

## 2024-01-17 NOTE — PROGRESS NOTES
Subjective   Patient ID: Mao Degroot is a 73 y.o. male who presents for Back Pain.    Having spasms and pain on the right side.   Not getting any better.  Had a medical massage done yesterday and was fine until this morning.       He is experiencing muscle spasms in the lower thoracic region of his back. He reports having back problems for decades but the spasms have been going on for several months. He was going to physical therapy but they were focusing more on his neck, not helping his back. Dr. Benton prescribed him a muscle relaxer. He did have a massage yesterday that offered temporary relief. He is not awoken at night. He was instructed by another physician not to see a chiropractor.          Review of Systems   Musculoskeletal:  Positive for back pain.       Objective   /74 (BP Location: Left arm, Patient Position: Sitting, BP Cuff Size: Adult)   Pulse 69   Temp 36.5 °C (97.7 °F) (Temporal)   Resp 14   Wt 90.8 kg (200 lb 3.2 oz)   SpO2 96%   BMI 34.36 kg/m²     Physical Exam  Constitutional:       Appearance: Normal appearance.   Musculoskeletal:      Thoracic back: Spasms and tenderness present. No swelling, edema, deformity, signs of trauma, lacerations or bony tenderness.   Neurological:      General: No focal deficit present.      Mental Status: He is alert.   Psychiatric:         Mood and Affect: Mood normal.         Behavior: Behavior normal.         Thought Content: Thought content normal.         Judgment: Judgment normal.         Assessment/Plan   Problem List Items Addressed This Visit       Spasm of muscle of lower back - Primary     Symptoms ongoing for several months, thoracic region.  History of lumbar and cervical back surgeries.  Order for x-ray placed.  Recommend ice, moist heat (20 minutes max), ice again as needed.  If xrays not show concern, will recommend chiropractic treatment and ongoing massotherapy in coordination           Relevant Orders    Referral to [facility]  [facility]       Scribe Attestation  By signing my name below, I, Sima Luz   attest that this documentation has been prepared under the direction and in the presence of Cheo Gleason MD.

## 2024-01-19 NOTE — PROGRESS NOTES
Assessment/Plan   The patient's right-sided lower back pain and tightness is consistent with myofascial pain.  Facet arthropathy or mild radicular component cannot be ruled out however the patient does not have any concerning signs of radiculopathy given lower extremity strength is intact and he does not have any overt radicular features or radiating symptoms in the lower extremities.  Although he does have prior lumbar spine surgeries there is no hardware placed.  Treatment will involve dry needling and soft tissue manipulation and we can also use spinal manipulation however we will use lower force around the lumbar region given his past surgeries. Patient is also getting massage therapy in our department.    Visits this year: 1    Subjective     HPI - LBP R>L 1/23/24 -patient reports constant right-sided lower back pain and tightness, feels like a spasm and it worsens with transitional movements or exertion.  Has been bothering him for the past 6 to 8 months. Continuously but notes that it first began when he was 36 years old.  Has had 2 back surgeries the most recent of which was a two-level laminectomy at L4-5 and L5-S1.  Surgery went very well.  Prior to the surgery he had lower extremity weakness and numbness and notes that the surgery itself went well.  Before that he had discectomy.  Prior to surgeries he was extremely active going on very long hikes.  Even now he still walks 2 miles per day.  Patient also had remote cervical spine fusion surgery but his neck and upper extremities are not bothering him at all.  Pain is mild at rest 1 out of 10 in intensity but gets moderate to severe at worst    Review of Systems   Constitutional:  Negative for fever.   Eyes:  Negative for visual disturbance.   Respiratory:  Negative for shortness of breath.    Cardiovascular:  Negative for chest pain.   Gastrointestinal:  Negative for diarrhea, nausea and vomiting.   Genitourinary:  Negative for difficulty urinating and  dysuria.   Skin:  Negative for color change.   Neurological:  Negative for dizziness.   Psychiatric/Behavioral:  Negative for agitation.    All other systems reviewed and are negative.    Objective   Examination findings (e.g., palpation & ROM): Dec LS ROM & HTN/tender R LS erectors & QL  BL SLR 45*     Segmental joint dysfunction was identified in the following areas using motion palpation and/or pain provocation assessment:  Cervical:   Thoracic: 5-8  Lumbopelvic: 1, BL SIJ      Physical Exam  Neurological:      General: No focal deficit present.      Mental Status: He is alert.      Cranial Nerves: No dysarthria or facial asymmetry.      Sensory: Sensation is intact.      Motor: Motor function is intact. No tremor.      Coordination: Coordination is intact.      Gait: Gait is intact.      Deep Tendon Reflexes:      Reflex Scores:       Patellar reflexes are 1+ on the right side and 1+ on the left side.       Achilles reflexes are 1+ on the right side and 1+ on the left side.     Comments: -ve Doan's sign BL       Plan   Today's treatment:  Dry needling (10 in, 10 out) to region of the chief complaint / hypertonic muscles identified upon palpation in R LS erectors & QL  Manual dynamic stretching of the gluteal muscles, hamstrings BL 6m  Patient noted reduced pain and improved mobility post-treatment    Treatment Plan:   The patient and I discussed the risks and benefits of chiropractic care. Based on the patient's subjective complaints along with the examination findings, it is advised that a course of chiropractic treatment by initiated. The patient provided consent for care. The patient tolerated today's treatment with little or no additional discomfort and was instructed to contact the office for questions or concerns. Will see patient once per week then every 2 weeks when symptoms become mild/manageable, further spaced apart contingent upon improvement.     This chart note was generated using dictation  software, and as such, there may be typographical errors present. Abbreviations: Cervical spine (CS), cervical-thoracic (CT), Dry needling (DN), Flexion adduction internal rotation (FAIR), high velocity, low amplitude (HVLA), Lumbar spine (LS), Soft tissue manipulation (STM), spinal manipulative therapy (SMT), Straight leg raise (SLR), Thoracic spine (TS).

## 2024-01-22 SDOH — SOCIAL STABILITY: SOCIAL NETWORK: PROMIS ABILITY TO PARTICIPATE IN SOCIAL ROLES & ACTIVITIES T-SCORE: 54

## 2024-01-22 SDOH — SOCIAL STABILITY: SOCIAL NETWORK: I HAVE TROUBLE DOING ALL OF THE ACTIVITIES WITH FRIENDS THAT I WANT TO DO: RARELY

## 2024-01-22 SDOH — SOCIAL STABILITY: SOCIAL NETWORK: I HAVE TROUBLE DOING ALL OF MY REGULAR LEISURE ACTIVITIES WITH OTHERS: NEVER

## 2024-01-22 SDOH — SOCIAL STABILITY: SOCIAL NETWORK

## 2024-01-22 SDOH — SOCIAL STABILITY: SOCIAL NETWORK: I HAVE TROUBLE DOING ALL OF THE FAMILY ACTIVITIES THAT I WANT TO DO: NEVER

## 2024-01-22 SDOH — SOCIAL STABILITY: SOCIAL NETWORK: I HAVE TROUBLE DOING ALL OF MY USUAL WORK (INCLUDE WORK AT HOME): SOMETIMES

## 2024-01-22 NOTE — PROGRESS NOTES
Condition of Client (C)   Follow Up Visit 1  Patient arrived ambulatory, steady gait, no assistive devices  Identified Patient via two identifiers: name and date of birth  No signs or symptoms of  COVID-19 noted per patient today  Fall Risk: patient did not need assistance to get on or position on the table  Denies allergy to topical lubricant/nuts/seeds/fruits  Reviewed diagnoses, problem list. Medications, platelet level/initial visit note, and client interview  No contraindications to massage precautions to massage include lighter pressure massage techniques due to history of stenosis of the cervical and lumbar spine, history of cervical fusion, use of Ibuprofen, last Platelet Level: 221 on 08.30.2023 (no new lab results)  Chief Complaint(S): intermittent, grabbing pain in the right posterior  mid-thoracic region rated 10/10  with muscle tension (last session constant dull pain in the entire lower back and neck  rated 5/10 and muscle tension-spasms in the right posterior mid-thoracic region rated 5/10)  Exacerbating Factors: lying on floor and not able to get up/sudden movements or getting up quickly lead to a grabbing pain, bumping into something  Alleviating Factors: muscle tension and pain: thc gummies and cbd/thc cream/physical therapy to the neck/upper body, temporary relief with Prednisone and muscle relaxer  Patient Goals/Intention: promote comfort and relief of pain/relaxation  Patient Preferences: lighter, firm pressure/swedish massage with therapy on areas of concern  Patient Noted: intermittent, grabbing pain in the right posterior mid-thoracic region rated 10/10  with muscle tension (last session constant dull pain in the entire lower back and neck  rated 5/10  and muscle tension-spasms in the right posterior mid-thoracic region rated 5/10)/anxiety and stress rated 0/10 (last session rated 0/10), denied fatigue (last session rated 0/10), coping rated 9/10 (rated 10/10 last session), and wellbeing  rated 9/10 (rated 10/10 last session)/benefits lasted throughout the day, but in the evening he moved and the movement exacerbated the pain and muscle tension/prescribed Prednisone for 6 days/9 taper) and it worked well, until he weaned down and then the pain and muscle tension returned; the relief was temporary with the Prednisone and the muscle relaxer promoted sleepiness  Patient Noted on 12.26.2023: lower back pain for 40 years, happened at work due to stress, noted spouse rubs his back/he has received one professional massage in the past/experienced 4 spinal surgeries: 2 neck and 2 lower back surgeries/purpose and errol: traveling, trips, and hiking, good relationship with spouse, nice life/constant, dull pain in the entire lower back and neck rated 5/10, the intensity of the pain varies/muscle tension-spasms in the right mid-thoracic region rated 5/10/denied anxiety and stress/denied fatigue, but naps in the afternoon, noted he takes multiple medications/coping rated 10/10/wellbeing rated 10/10     Actions (A)  Provider reviewed plan for the massage session, no contraindications and precautions include: lighter pressure massage due to diagnoses of lumbar stenosis, history of spinal surgeries to neck (2) and lower back (2), and medications: muscle relaxant, platelets 221 on 08.30,2023 - no new labs documented, and patient has given consent to treat with full understanding of what to expect during the session.  Before massage therapy began, provider explained to the patient to communicate at any time if the pressure was causing discomfort past their tolerance level. Patient agreed to advise therapist.  Massage Pressure Scale: 0-2/5   50 minute session: Tamazight Relaxation, upper body massage with focus to areas of concern: right posterior mid-thoracic region  Supine Position: lighter pressure Swedish Massage to the head and ears to promote relaxation, posterolateral neck, shoulders and upper back, using the body as  counter pressure/light stripping to the pectoralis minor muscles  Prone: superficial fascial release to the posterior neck, shoulders, and back with focus to the right midthoracic region: lighter stripping intercostals 6-12, erector spinae muscles/lighter pressure stripping and cross-fiber friction to the lumbar region vertical surgical scar (well-healed scar)  Used bolster under knees while supine/bolster under ankles while prone  Used Biotone Advanced Therapy Lotion  Used heated table pad, set to patient's comfort preference  Soft, relaxing music    Response (R)  Patient noted improvement in symptoms: intermittent, grabbing pain in the right posterior mid-thoracic region rated 0/10 , decreased 10 levels and resolved and only feels tension with movement/anxiety and stress rated 0/10, rating did not change, fatigue 0/10, rating did not change, coping rated 9/10, rating did not change, wellbeing 9/10, rating did not change  Patient's Subjective Pressure Rating: comfortable and relaxing  Patient noted a sense of well-being    Evaluation (E):  Patient independent to get off massage table.  Recommendation: 50 minute massage as he can schedule  Advised that acupuncture or chiropractic treatments would be beneficial - will consider this therapy after he meets with his PCP on 01.17.2024  Advised that practicing ROM to the postural muscles during the workday help blood circulation and decrease tension and encourage waste products of metabolism to move toward organs of elimination  2.   Advised that self-massage of scalp/ears can promote relaxation and extend the benefits of the massage therapy session  Appointment to be scheduled, would like to continue massage therapy: listed on waiting list  Patient prefers relaxation massage with therapeutic massage to defined areas.  Patient gave permission to send Home Self-Care Practice documents via: [email or web address]  Self-Care Home Practices include: [facility] [employer] Guided  Imagery links/Gentle Range of Motion and Movement/Habit Stacking/Heart Centered Meditation/Mindful Breathin-2-6 and Anchoring Affirmations/Positional Releases: Scalenes, Levator Scapulae, Upper Trapezius, Splenius, and Quadratus Lumborum/Progressive Relaxation  Provided a business card so patient can contact therapist with any concerns or questions about the treatment protocol

## 2024-01-23 ENCOUNTER — ALLIED HEALTH (OUTPATIENT)
Dept: INTEGRATIVE MEDICINE | Facility: CLINIC | Age: 74
End: 2024-01-23
Payer: MEDICARE

## 2024-01-23 DIAGNOSIS — M54.50 CHRONIC RIGHT-SIDED LOW BACK PAIN WITHOUT SCIATICA: ICD-10-CM

## 2024-01-23 DIAGNOSIS — G89.29 CHRONIC RIGHT-SIDED LOW BACK PAIN WITHOUT SCIATICA: ICD-10-CM

## 2024-01-23 DIAGNOSIS — M79.10 MYALGIA: ICD-10-CM

## 2024-01-23 DIAGNOSIS — M99.05 SOMATIC DYSFUNCTION OF PELVIS REGION: ICD-10-CM

## 2024-01-23 DIAGNOSIS — M99.02 SOMATIC DYSFUNCTION OF THORACIC REGION: Primary | ICD-10-CM

## 2024-01-23 DIAGNOSIS — M99.03 SOMATIC DYSFUNCTION OF LUMBAR REGION: ICD-10-CM

## 2024-01-23 PROCEDURE — 99203 OFFICE O/P NEW LOW 30 MIN: CPT | Performed by: CHIROPRACTOR

## 2024-01-23 ASSESSMENT — ENCOUNTER SYMPTOMS
COLOR CHANGE: 0
SHORTNESS OF BREATH: 0
FEVER: 0
NAUSEA: 0
DIZZINESS: 0
AGITATION: 0
VOMITING: 0
DIARRHEA: 0
DYSURIA: 0
DIFFICULTY URINATING: 0

## 2024-01-26 ASSESSMENT — ENCOUNTER SYMPTOMS
NAUSEA: 0
DIZZINESS: 0
AGITATION: 0
FEVER: 0
DYSURIA: 0
DIFFICULTY URINATING: 0
COLOR CHANGE: 0
DIARRHEA: 0
VOMITING: 0
SHORTNESS OF BREATH: 0

## 2024-01-26 NOTE — PROGRESS NOTES
Assessment/Plan   The patient's right-sided lower back pain and tightness is consistent with myofascial pain.  Facet arthropathy or mild radicular component cannot be ruled out however the patient does not have any concerning signs of radiculopathy given lower extremity strength is intact and he does not have any overt radicular features or radiating symptoms in the lower extremities.  Although he does have prior lumbar spine surgeries there is no hardware placed.  Treatment will involve dry needling and soft tissue manipulation and we can also use spinal manipulation however we will use lower force around the lumbar region given his past surgeries. Patient is also getting massage therapy in our department.    Visits this year: 2    Subjective   Patient notes significant improvement since his first visit noting that his right-sided lower back spasm and pain has subsided in intensity and frequency.  Felt immediate response to treatment noting reduction in spasm and currently symptoms are mild.  Has been walking more as well noting that he is going for 2 walks per day instead of 1    HPI - LBP R>L 1/23/24 -patient reports constant right-sided lower back pain and tightness, feels like a spasm and it worsens with transitional movements or exertion.  Has been bothering him for the past 6 to 8 months. Continuously but notes that it first began when he was 36 years old.  Has had 2 back surgeries the most recent of which was a two-level laminectomy at L4-5 and L5-S1.  Surgery went very well.  Prior to the surgery he had lower extremity weakness and numbness and notes that the surgery itself went well.  Before that he had discectomy.  Prior to surgeries he was extremely active going on very long hikes.  Even now he still walks 2 miles per day.  Patient also had remote cervical spine fusion surgery but his neck and upper extremities are not bothering him at all.  Pain is mild at rest 1 out of 10 in intensity but gets moderate  to severe at worst    Review of Systems   Constitutional:  Negative for fever.   Eyes:  Negative for visual disturbance.   Respiratory:  Negative for shortness of breath.    Cardiovascular:  Negative for chest pain.   Gastrointestinal:  Negative for diarrhea, nausea and vomiting.   Genitourinary:  Negative for difficulty urinating and dysuria.   Skin:  Negative for color change.   Neurological:  Negative for dizziness.   Psychiatric/Behavioral:  Negative for agitation.    All other systems reviewed and are negative.    Objective   Examination findings (e.g., palpation & ROM): Dec LS ROM & HTN/tender R LS erectors & QL  BL SLR 45*  -ve FAIR BL     Segmental joint dysfunction was identified in the following areas using motion palpation and/or pain provocation assessment:  Cervical:   Thoracic: 5-7  Lumbopelvic: 1, BL SIJ      Physical Exam  Neurological:      General: No focal deficit present.      Mental Status: He is alert.      Cranial Nerves: No dysarthria or facial asymmetry.      Sensory: Sensation is intact.      Motor: Motor function is intact. No tremor.      Coordination: Coordination is intact.      Gait: Gait is intact.      Deep Tendon Reflexes:      Reflex Scores:       Patellar reflexes are 1+ on the right side and 1+ on the left side.       Achilles reflexes are 1+ on the right side and 1+ on the left side.     Comments: -ve Doan's sign BL       Plan   Today's treatment:  Dry needling (10 in, 10 out) to region of the chief complaint / hypertonic muscles identified upon palpation in R LS erectors & QL  Manual dynamic stretching of the gluteal muscles, hamstrings BL 6m  Patient noted reduced pain and improved mobility post-treatment    Treatment Plan:   The patient and I discussed the risks and benefits of chiropractic care. Based on the patient's subjective complaints along with the examination findings, it is advised that a course of chiropractic treatment by initiated. The patient provided consent  for care. The patient tolerated today's treatment with little or no additional discomfort and was instructed to contact the office for questions or concerns. Will see patient once per week then every 2 weeks when symptoms become mild/manageable, further spaced apart contingent upon improvement.     This chart note was generated using dictation software, and as such, there may be typographical errors present. Abbreviations: Cervical spine (CS), cervical-thoracic (CT), Dry needling (DN), Flexion adduction internal rotation (FAIR), high velocity, low amplitude (HVLA), Lumbar spine (LS), Soft tissue manipulation (STM), spinal manipulative therapy (SMT), Straight leg raise (SLR), Thoracic spine (TS).

## 2024-01-30 ENCOUNTER — ALLIED HEALTH (OUTPATIENT)
Dept: INTEGRATIVE MEDICINE | Facility: CLINIC | Age: 74
End: 2024-01-30
Payer: MEDICARE

## 2024-01-30 DIAGNOSIS — M79.10 MYALGIA: ICD-10-CM

## 2024-01-30 DIAGNOSIS — M99.02 SOMATIC DYSFUNCTION OF THORACIC REGION: Primary | ICD-10-CM

## 2024-01-30 DIAGNOSIS — M99.05 SOMATIC DYSFUNCTION OF PELVIS REGION: ICD-10-CM

## 2024-01-30 DIAGNOSIS — M99.03 SOMATIC DYSFUNCTION OF LUMBAR REGION: ICD-10-CM

## 2024-01-30 DIAGNOSIS — G89.29 CHRONIC RIGHT-SIDED LOW BACK PAIN WITHOUT SCIATICA: ICD-10-CM

## 2024-01-30 DIAGNOSIS — M54.50 CHRONIC RIGHT-SIDED LOW BACK PAIN WITHOUT SCIATICA: ICD-10-CM

## 2024-01-30 PROCEDURE — 98941 CHIROPRACT MANJ 3-4 REGIONS: CPT | Performed by: CHIROPRACTOR

## 2024-01-31 ENCOUNTER — APPOINTMENT (OUTPATIENT)
Dept: PRIMARY CARE | Facility: CLINIC | Age: 74
End: 2024-01-31
Payer: MEDICARE

## 2024-02-02 ASSESSMENT — ENCOUNTER SYMPTOMS
DIFFICULTY URINATING: 0
DIZZINESS: 0
DIARRHEA: 0
FEVER: 0
NAUSEA: 0
DYSURIA: 0
AGITATION: 0
VOMITING: 0
SHORTNESS OF BREATH: 0
COLOR CHANGE: 0

## 2024-02-02 NOTE — PROGRESS NOTES
Assessment/Plan   The patient's right-sided lower back pain and tightness is consistent with myofascial pain.  Facet arthropathy or mild radicular component cannot be ruled out however the patient does not have any concerning signs of radiculopathy given lower extremity strength is intact and he does not have any overt radicular features or radiating symptoms in the lower extremities.  Although he does have prior lumbar spine surgeries there is no hardware placed.  Treatment will involve dry needling and soft tissue manipulation and we can also use spinal manipulation however we will use lower force around the lumbar region given his past surgeries. Patient is also getting massage therapy in our department.    Visits this year: 3    Subjective   Patient reports that he is doing much better with only mild intermittent thoracolumbar and right-sided low back pain and tightness.  Has been walking more regularly, walking his dogs.  Less increases in pain with transitional movements such as rolling over or standing up from sitting.    HPI - LBP R>L 1/23/24 -patient reports constant right-sided lower back pain and tightness, feels like a spasm and it worsens with transitional movements or exertion.  Has been bothering him for the past 6 to 8 months. Continuously but notes that it first began when he was 36 years old.  Has had 2 back surgeries the most recent of which was a two-level laminectomy at L4-5 and L5-S1.  Surgery went very well.  Prior to the surgery he had lower extremity weakness and numbness and notes that the surgery itself went well.  Before that he had discectomy.  Prior to surgeries he was extremely active going on very long hikes.  Even now he still walks 2 miles per day.  Patient also had remote cervical spine fusion surgery but his neck and upper extremities are not bothering him at all.  Pain is mild at rest 1 out of 10 in intensity but gets moderate to severe at worst    Review of Systems    Constitutional:  Negative for fever.   Eyes:  Negative for visual disturbance.   Respiratory:  Negative for shortness of breath.    Cardiovascular:  Negative for chest pain.   Gastrointestinal:  Negative for diarrhea, nausea and vomiting.   Genitourinary:  Negative for difficulty urinating and dysuria.   Skin:  Negative for color change.   Neurological:  Negative for dizziness.   Psychiatric/Behavioral:  Negative for agitation.    All other systems reviewed and are negative.    Objective   Examination findings (e.g., palpation & ROM): Dec LS ROM & HTN/tender R LS erectors & QL  BL SLR 50  -ve FAIR BL     Segmental joint dysfunction was identified in the following areas using motion palpation and/or pain provocation assessment:  Cervical:   Thoracic: 5-8  Lumbopelvic: 1, BL SIJ      Physical Exam  Neurological:      General: No focal deficit present.      Mental Status: He is alert.      Cranial Nerves: No dysarthria or facial asymmetry.      Sensory: Sensation is intact.      Motor: Motor function is intact. No tremor.      Coordination: Coordination is intact.      Gait: Gait is intact.      Deep Tendon Reflexes:      Reflex Scores:       Patellar reflexes are 1+ on the right side and 1+ on the left side.       Achilles reflexes are 1+ on the right side and 1+ on the left side.     Comments: -ve Doan's sign BL       Plan   Today's treatment:  Dry needling (10 in, 10 out) to region of the chief complaint / hypertonic muscles identified upon palpation in R LS erectors & QL  Manual dynamic stretching of the gluteal muscles, hamstrings BL 6m  Patient noted reduced pain and improved mobility post-treatment    Treatment Plan:   The patient and I discussed the risks and benefits of chiropractic care. Based on the patient's subjective complaints along with the examination findings, it is advised that a course of chiropractic treatment by initiated. The patient provided consent for care. The patient tolerated today's  treatment with little or no additional discomfort and was instructed to contact the office for questions or concerns. Will see patient once per week then every 2 weeks when symptoms become mild/manageable, further spaced apart contingent upon improvement.     This chart note was generated using dictation software, and as such, there may be typographical errors present. Abbreviations: Cervical spine (CS), cervical-thoracic (CT), Dry needling (DN), Flexion adduction internal rotation (FAIR), high velocity, low amplitude (HVLA), Lumbar spine (LS), Soft tissue manipulation (STM), spinal manipulative therapy (SMT), Straight leg raise (SLR), Thoracic spine (TS).

## 2024-02-02 NOTE — PROGRESS NOTES
Assessment/Plan   The patient's right-sided lower back pain and tightness is consistent with myofascial pain.  Facet arthropathy or mild radicular component cannot be ruled out however the patient does not have any concerning signs of radiculopathy given lower extremity strength is intact and he does not have any overt radicular features or radiating symptoms in the lower extremities.  Although he does have prior lumbar spine surgeries there is no hardware placed.  Treatment will involve dry needling and soft tissue manipulation and we can also use spinal manipulation however we will use lower force around the lumbar region given his past surgeries. Patient is also getting massage therapy in our department.    Visits this year: 3    Subjective   Patient reports continued improvement in lower back pain noting that symptoms are mild and intermittent and he only has occasional episodes of moderate pain which are very brief and transient in nature for example when rolling over in bed.  He feels that he is almost back to his usual self and is walking his dogs twice per day with only slight fatigue in the legs after walking too long.    HPI - LBP R>L 1/23/24 -patient reports constant right-sided lower back pain and tightness, feels like a spasm and it worsens with transitional movements or exertion.  Has been bothering him for the past 6 to 8 months. Continuously but notes that it first began when he was 36 years old.  Has had 2 back surgeries the most recent of which was a two-level laminectomy at L4-5 and L5-S1.  Surgery went very well.  Prior to the surgery he had lower extremity weakness and numbness and notes that the surgery itself went well.  Before that he had discectomy.  Prior to surgeries he was extremely active going on very long hikes.  Even now he still walks 2 miles per day.  Patient also had remote cervical spine fusion surgery but his neck and upper extremities are not bothering him at all.  Pain is mild  at rest 1 out of 10 in intensity but gets moderate to severe at worst    Review of Systems   Constitutional:  Negative for fever.   Eyes:  Negative for visual disturbance.   Respiratory:  Negative for shortness of breath.    Cardiovascular:  Negative for chest pain.   Gastrointestinal:  Negative for diarrhea, nausea and vomiting.   Genitourinary:  Negative for difficulty urinating and dysuria.   Skin:  Negative for color change.   Neurological:  Negative for dizziness.   Psychiatric/Behavioral:  Negative for agitation.    All other systems reviewed and are negative.    Objective   Examination findings (e.g., palpation & ROM): Dec LS ROM & HTN/tender R LS erectors & QL  BL SLR 50*  -ve FAIR BL     Segmental joint dysfunction was identified in the following areas using motion palpation and/or pain provocation assessment:  Cervical:   Thoracic: 5-7  Lumbopelvic: 1, BL SIJ      Physical Exam  Neurological:      General: No focal deficit present.      Mental Status: He is alert.      Cranial Nerves: No dysarthria or facial asymmetry.      Sensory: Sensation is intact.      Motor: Motor function is intact. No tremor.      Coordination: Coordination is intact.      Gait: Gait is intact.      Deep Tendon Reflexes:      Reflex Scores:       Patellar reflexes are 1+ on the right side and 1+ on the left side.       Achilles reflexes are 1+ on the right side and 1+ on the left side.     Comments: -ve Doan's sign BL       Plan   Today's treatment:  Dry needling (10 in, 10 out) to region of the chief complaint / hypertonic muscles identified upon palpation in R LS erectors & QL  Manual dynamic stretching of the gluteal muscles, hamstrings BL 6m  Patient noted reduced pain and improved mobility post-treatment    Treatment Plan:   The patient and I discussed the risks and benefits of chiropractic care. Based on the patient's subjective complaints along with the examination findings, it is advised that a course of chiropractic  treatment by initiated. The patient provided consent for care. The patient tolerated today's treatment with little or no additional discomfort and was instructed to contact the office for questions or concerns. Will see patient once per week then every 2 weeks when symptoms become mild/manageable, further spaced apart contingent upon improvement.     This chart note was generated using dictation software, and as such, there may be typographical errors present. Abbreviations: Cervical spine (CS), cervical-thoracic (CT), Dry needling (DN), Flexion adduction internal rotation (FAIR), high velocity, low amplitude (HVLA), Lumbar spine (LS), Soft tissue manipulation (STM), spinal manipulative therapy (SMT), Straight leg raise (SLR), Thoracic spine (TS).

## 2024-02-06 ENCOUNTER — ALLIED HEALTH (OUTPATIENT)
Dept: INTEGRATIVE MEDICINE | Facility: CLINIC | Age: 74
End: 2024-02-06

## 2024-02-06 ENCOUNTER — ALLIED HEALTH (OUTPATIENT)
Dept: INTEGRATIVE MEDICINE | Facility: CLINIC | Age: 74
End: 2024-02-06
Payer: MEDICARE

## 2024-02-06 DIAGNOSIS — M99.02 SOMATIC DYSFUNCTION OF THORACIC REGION: Primary | ICD-10-CM

## 2024-02-06 DIAGNOSIS — M54.50 CHRONIC RIGHT-SIDED LOW BACK PAIN WITHOUT SCIATICA: ICD-10-CM

## 2024-02-06 DIAGNOSIS — M99.03 SOMATIC DYSFUNCTION OF LUMBAR REGION: ICD-10-CM

## 2024-02-06 DIAGNOSIS — G89.29 CHRONIC RIGHT-SIDED LOW BACK PAIN WITHOUT SCIATICA: ICD-10-CM

## 2024-02-06 DIAGNOSIS — M79.10 MYALGIA: ICD-10-CM

## 2024-02-06 DIAGNOSIS — M99.05 SOMATIC DYSFUNCTION OF PELVIS REGION: ICD-10-CM

## 2024-02-06 PROCEDURE — 98941 CHIROPRACT MANJ 3-4 REGIONS: CPT | Performed by: CHIROPRACTOR

## 2024-02-06 PROCEDURE — MASSG60 MASSAGE 60 MINUTES: Performed by: MASSAGE THERAPIST

## 2024-02-06 ASSESSMENT — PAIN DESCRIPTION - DESCRIPTORS: DESCRIPTORS: OTHER (COMMENT)

## 2024-02-06 ASSESSMENT — PAIN SCALES - GENERAL: PAINLEVEL_OUTOF10: 0 - NO PAIN

## 2024-02-06 NOTE — PROGRESS NOTES
Condition of Client (C)   Follow Up Visit 2  Patient arrived ambulatory, steady gait, no assistive devices  Identified Patient via two identifiers: name and date of birth  No signs or symptoms of  COVID-19 noted per patient today  Fall Risk: patient did not need assistance to get on or position on the table  Denies allergy to topical lubricant/nuts/seeds/fruits  Reviewed diagnoses, problem list. Medications, platelet level/client interview/follow up visit 1 note  No contraindications to massage/precautions to massage include lighter pressure massage techniques due to history of stenosis of the cervical and lumbar spine, history of cervical fusion, use of Ibuprofen, last Platelet Level: 221 on 08.30.2023 (no new lab results noted)  Chief Complaint(S): intermittent, grabbing pain  with movement in the right posterior mid-thoracic region and lateral torso rated 2/10  with muscle tension (last session rated 10/10), much improved  Exacerbating Factors: lying on floor and not able to get up/sudden movements or getting up quickly lead to a grabbing pain, bumping into something  Alleviating Factors: muscle tension and pain: thc gummies and cbd/thc cream/physical therapy to the neck/upper body, temporary relief with Prednisone and muscle relaxer/massage therapy, chiropractic  Patient Goals/Intention: promote comfort and relief of pain/relaxation  Patient Preferences: lighter, firm pressure/swedish massage with therapy on areas of concern  Patient Noted: received chiropractic care with Dr. Dumont and felt dry needling is effective/still feels pain, but it is very minor and much improved; can deal with this pain experience/felt a huge difference after the last massage therapy treatment//the pain he was experiencing with that high intensity, was something he noted he could not deal with long term/he is administering less medication, no muscle relaxer, and using some topical cbd oil/salve and a 1/2 gummy/noticing he is being  more active/he is planning to walk in [place], again, walking dog more often/talked about his last trip to [facility] and about the animals and nature/intermittent, grabbing pain  with movement in the right posterior mid-thoracic region and lateral torso rated 2/10  with muscle tension (last session rated 10/10, much improved)/anxiety and stress rated 0/10 (last session rated 0/10), fatigue rated 0/10 (last session rated 0/10), coping rated 9/10 (rated 9/10 last session), and wellbeing rated 9/10 (rated 9/10 last session)  Patient Noted on 12.26.2023: lower back pain for 40 years, happened at work due to stress, noted spouse rubs his back/he has received one professional massage in the past/experienced 4 spinal surgeries: 2 neck and 2 lower back surgeries/purpose and errol: traveling, trips, and hiking, good relationship with spouse, nice life/constant, dull pain in the entire lower back and neck rated 5/10, the intensity of the pain varies/muscle tension-spasms in the right mid-thoracic region rated 5/10/denied anxiety and stress/denied fatigue, but naps in the afternoon, noted he takes multiple medications/coping rated 10/10/wellbeing rated 10/10     Actions (A)  Provider reviewed plan for the massage session, no contraindications and precautions include: lighter pressure massage due to diagnoses of lumbar stenosis, history of spinal surgeries to neck (2) and lower back (2), and medications: muscle relaxant, platelets 221 on 08.30,2023 - no new labs documented, and patient has given consent to treat with full understanding of what to expect during the session.  Before massage therapy began, provider explained to the patient to communicate at any time if the pressure was causing discomfort past their tolerance level. Patient agreed to advise therapist.  Massage Pressure Scale: 0-2/5   50 minute session: Israeli Relaxation, upper body massage with focus to areas of concern: right posterior mid-thoracic region  Supine  Position: lighter pressure Swedish Massage to the head and ears to promote relaxation, posterolateral neck, shoulders and upper back, using the body as counter pressure/light stripping to the pectoralis minor muscles  Prone: superficial fascial release to the posterior neck, shoulders, and back with focus to the right midthoracic region: lighter stripping intercostals 6-12, erector spinae muscles/lighter pressure stripping and cross-fiber friction to the lumbar region vertical surgical scar (well-healed scar)  Used bolster under knees while supine/bolster under ankles while prone  Used Biotone Advanced Therapy Lotion and Soothing Touch Ayurvedic Lavender Oil  Used heated table pad, set to patient's comfort preference  Soft, relaxing music    Response (R)  Patient noted improvement in symptoms: intermittent, grabbing pain  with movement in the right posterior mid-thoracic region and lateral torso rated 0/10, decreased 2 levels/anxiety and stress rated 0/10, rating did not change, fatigue rated 0/10, rating did not change, coping rated 9/10, rating did not change, and wellbeing rated 9/10, rating did not change  Patient's Subjective Pressure Rating: comfortable and relaxing  Patient noted a sense of well-being    Evaluation (E):  Patient independent to get off massage table.  Recommendation: 50 minute massage as he can schedule  Advised that acupuncture or chiropractic treatments would be beneficial - will consider this therapy after he meets with his PCP on 01.17.2024  Advised that practicing ROM to the postural muscles during the day help blood circulation and decrease tension and encourage waste products of metabolism to move toward organs of elimination  2.   Advised that self-massage of scalp/ears can promote relaxation and extend the benefits of the massage therapy session  Massage Therapy Appointments to be scheduled/will continue with Chiropractic treatment  Patient prefers relaxation massage with therapeutic  massage to defined areas.  Patient gave permission to send Home Self-Care Practice documents via: [email or web address]  Self-Care Home Practices include: [facility] [employer] Guided Imagery links/Gentle Range of Motion and Movement/Habit Stacking/Heart Centered Meditation/Mindful Breathin-2-6 and Anchoring Affirmations/Positional Releases: Scalenes, Levator Scapulae, Upper Trapezius, Splenius, and Quadratus Lumborum/Progressive Relaxation  Provided a business card so patient can contact therapist with any concerns or questions about the treatment protocol

## 2024-02-12 ASSESSMENT — ENCOUNTER SYMPTOMS
COLOR CHANGE: 0
VOMITING: 0
NAUSEA: 0
AGITATION: 0
FEVER: 0
SHORTNESS OF BREATH: 0
DYSURIA: 0
DIARRHEA: 0
DIFFICULTY URINATING: 0
DIZZINESS: 0

## 2024-02-13 ENCOUNTER — ALLIED HEALTH (OUTPATIENT)
Dept: INTEGRATIVE MEDICINE | Facility: CLINIC | Age: 74
End: 2024-02-13
Payer: MEDICARE

## 2024-02-13 DIAGNOSIS — M99.05 SOMATIC DYSFUNCTION OF PELVIS REGION: ICD-10-CM

## 2024-02-13 DIAGNOSIS — M79.10 MYALGIA: ICD-10-CM

## 2024-02-13 DIAGNOSIS — M54.50 CHRONIC RIGHT-SIDED LOW BACK PAIN WITHOUT SCIATICA: ICD-10-CM

## 2024-02-13 DIAGNOSIS — G89.29 CHRONIC RIGHT-SIDED LOW BACK PAIN WITHOUT SCIATICA: ICD-10-CM

## 2024-02-13 DIAGNOSIS — M99.03 SOMATIC DYSFUNCTION OF LUMBAR REGION: ICD-10-CM

## 2024-02-13 DIAGNOSIS — M99.02 SOMATIC DYSFUNCTION OF THORACIC REGION: Primary | ICD-10-CM

## 2024-02-13 PROCEDURE — 98941 CHIROPRACT MANJ 3-4 REGIONS: CPT | Performed by: CHIROPRACTOR

## 2024-02-16 ASSESSMENT — ENCOUNTER SYMPTOMS
AGITATION: 0
FEVER: 0
VOMITING: 0
COLOR CHANGE: 0
NAUSEA: 0
DIFFICULTY URINATING: 0
SHORTNESS OF BREATH: 0
DIZZINESS: 0
DIARRHEA: 0
DYSURIA: 0

## 2024-02-16 NOTE — PROGRESS NOTES
Assessment/Plan   The patient's right-sided lower back pain and tightness is consistent with myofascial pain.  Facet arthropathy or mild radicular component cannot be ruled out however the patient does not have any concerning signs of radiculopathy given lower extremity strength is intact and he does not have any overt radicular features or radiating symptoms in the lower extremities.  Although he does have prior lumbar spine surgeries there is no hardware placed.  Treatment will involve dry needling and soft tissue manipulation and we can also use spinal manipulation however we will use lower force around the lumbar region given his past surgeries. Patient is also getting massage therapy in our department.    Visits this year: 4    Subjective   Patient notes feeling similar to last visit, but still overall much better than prior to starting care in our office. No recent grabbing type pain that is severe. Only has mild intermittent thoracolumbar pain on R side. Has been exercising regularly by walking his dogs.     HPI - LBP R>L 1/23/24 -patient reports constant right-sided lower back pain and tightness, feels like a spasm and it worsens with transitional movements or exertion.  Has been bothering him for the past 6 to 8 months. Continuously but notes that it first began when he was 36 years old.  Has had 2 back surgeries the most recent of which was a two-level laminectomy at L4-5 and L5-S1.  Surgery went very well.  Prior to the surgery he had lower extremity weakness and numbness and notes that the surgery itself went well.  Before that he had discectomy.  Prior to surgeries he was extremely active going on very long hikes.  Even now he still walks 2 miles per day.  Patient also had remote cervical spine fusion surgery but his neck and upper extremities are not bothering him at all.  Pain is mild at rest 1 out of 10 in intensity but gets moderate to severe at worst    Review of Systems   Constitutional:  Negative  for fever.   Eyes:  Negative for visual disturbance.   Respiratory:  Negative for shortness of breath.    Cardiovascular:  Negative for chest pain.   Gastrointestinal:  Negative for diarrhea, nausea and vomiting.   Genitourinary:  Negative for difficulty urinating and dysuria.   Skin:  Negative for color change.   Neurological:  Negative for dizziness.   Psychiatric/Behavioral:  Negative for agitation.    All other systems reviewed and are negative.    Objective   Examination findings (e.g., palpation & ROM): Dec LS ROM & HTN/tender R LS erectors & QL  BL SLR 55  -ve FAIR BL     Segmental joint dysfunction was identified in the following areas using motion palpation and/or pain provocation assessment:  Cervical:   Thoracic: 5-7  Lumbopelvic: 1, BL SIJ      Physical Exam  Neurological:      General: No focal deficit present.      Mental Status: He is alert.      Cranial Nerves: No dysarthria or facial asymmetry.      Sensory: Sensation is intact.      Motor: Motor function is intact. No tremor.      Coordination: Coordination is intact.      Gait: Gait is intact.      Deep Tendon Reflexes:      Reflex Scores:       Patellar reflexes are 1+ on the right side and 1+ on the left side.       Achilles reflexes are 1+ on the right side and 1+ on the left side.     Comments: -ve Doan's sign BL       Plan   Today's treatment:  Dry needling (10 in, 10 out) to region of the chief complaint / hypertonic muscles identified upon palpation in R LS erectors & QL  Manual dynamic stretching of the gluteal muscles, hamstrings BL 6m  Patient noted reduced pain and improved mobility post-treatment    Treatment Plan:   The patient and I discussed the risks and benefits of chiropractic care. Based on the patient's subjective complaints along with the examination findings, it is advised that a course of chiropractic treatment by initiated. The patient provided consent for care. The patient tolerated today's treatment with little or no  additional discomfort and was instructed to contact the office for questions or concerns. Will see patient once per week then every 2 weeks when symptoms become mild/manageable, further spaced apart contingent upon improvement.     This chart note was generated using dictation software, and as such, there may be typographical errors present. Abbreviations: Cervical spine (CS), cervical-thoracic (CT), Dry needling (DN), Flexion adduction internal rotation (FAIR), high velocity, low amplitude (HVLA), Lumbar spine (LS), Soft tissue manipulation (STM), spinal manipulative therapy (SMT), Straight leg raise (SLR), Thoracic spine (TS).

## 2024-02-20 ENCOUNTER — ALLIED HEALTH (OUTPATIENT)
Dept: INTEGRATIVE MEDICINE | Facility: CLINIC | Age: 74
End: 2024-02-20
Payer: MEDICARE

## 2024-02-20 DIAGNOSIS — G89.29 CHRONIC RIGHT-SIDED LOW BACK PAIN WITHOUT SCIATICA: ICD-10-CM

## 2024-02-20 DIAGNOSIS — M79.10 MYALGIA: ICD-10-CM

## 2024-02-20 DIAGNOSIS — M99.03 SOMATIC DYSFUNCTION OF LUMBAR REGION: ICD-10-CM

## 2024-02-20 DIAGNOSIS — M54.50 CHRONIC RIGHT-SIDED LOW BACK PAIN WITHOUT SCIATICA: ICD-10-CM

## 2024-02-20 DIAGNOSIS — M99.05 SOMATIC DYSFUNCTION OF PELVIS REGION: ICD-10-CM

## 2024-02-20 DIAGNOSIS — M99.02 SOMATIC DYSFUNCTION OF THORACIC REGION: Primary | ICD-10-CM

## 2024-02-20 PROCEDURE — 98941 CHIROPRACT MANJ 3-4 REGIONS: CPT | Performed by: CHIROPRACTOR

## 2024-03-21 ENCOUNTER — OFFICE VISIT (OUTPATIENT)
Dept: PRIMARY CARE | Facility: CLINIC | Age: 74
End: 2024-03-21
Payer: MEDICARE

## 2024-03-21 VITALS
HEART RATE: 78 BPM | OXYGEN SATURATION: 97 % | WEIGHT: 201 LBS | DIASTOLIC BLOOD PRESSURE: 88 MMHG | TEMPERATURE: 98 F | SYSTOLIC BLOOD PRESSURE: 123 MMHG | RESPIRATION RATE: 14 BRPM | BODY MASS INDEX: 34.5 KG/M2

## 2024-03-21 DIAGNOSIS — N52.9 ERECTILE DYSFUNCTION, UNSPECIFIED ERECTILE DYSFUNCTION TYPE: ICD-10-CM

## 2024-03-21 DIAGNOSIS — Z12.5 PROSTATE CANCER SCREENING: ICD-10-CM

## 2024-03-21 DIAGNOSIS — M62.830 SPASM OF MUSCLE OF LOWER BACK: ICD-10-CM

## 2024-03-21 DIAGNOSIS — Z95.1 S/P CABG X 4: ICD-10-CM

## 2024-03-21 DIAGNOSIS — Z13.89 SCREENING FOR MULTIPLE CONDITIONS: ICD-10-CM

## 2024-03-21 DIAGNOSIS — K21.9 GASTROESOPHAGEAL REFLUX DISEASE WITHOUT ESOPHAGITIS: ICD-10-CM

## 2024-03-21 DIAGNOSIS — R73.03 PREDIABETES: ICD-10-CM

## 2024-03-21 DIAGNOSIS — Z00.00 ROUTINE GENERAL MEDICAL EXAMINATION AT HEALTH CARE FACILITY: Primary | ICD-10-CM

## 2024-03-21 DIAGNOSIS — D12.6 ADENOMATOUS POLYP OF COLON, UNSPECIFIED PART OF COLON: ICD-10-CM

## 2024-03-21 DIAGNOSIS — I10 BENIGN ESSENTIAL HYPERTENSION: ICD-10-CM

## 2024-03-21 DIAGNOSIS — E78.2 MIXED HYPERLIPIDEMIA: ICD-10-CM

## 2024-03-21 DIAGNOSIS — G47.33 OSA ON CPAP: ICD-10-CM

## 2024-03-21 PROCEDURE — G0444 DEPRESSION SCREEN ANNUAL: HCPCS | Performed by: FAMILY MEDICINE

## 2024-03-21 PROCEDURE — 1159F MED LIST DOCD IN RCRD: CPT | Performed by: FAMILY MEDICINE

## 2024-03-21 PROCEDURE — 1123F ACP DISCUSS/DSCN MKR DOCD: CPT | Performed by: FAMILY MEDICINE

## 2024-03-21 PROCEDURE — 1160F RVW MEDS BY RX/DR IN RCRD: CPT | Performed by: FAMILY MEDICINE

## 2024-03-21 PROCEDURE — 1036F TOBACCO NON-USER: CPT | Performed by: FAMILY MEDICINE

## 2024-03-21 PROCEDURE — G0439 PPPS, SUBSEQ VISIT: HCPCS | Performed by: FAMILY MEDICINE

## 2024-03-21 PROCEDURE — 1170F FXNL STATUS ASSESSED: CPT | Performed by: FAMILY MEDICINE

## 2024-03-21 PROCEDURE — 3074F SYST BP LT 130 MM HG: CPT | Performed by: FAMILY MEDICINE

## 2024-03-21 PROCEDURE — 3079F DIAST BP 80-89 MM HG: CPT | Performed by: FAMILY MEDICINE

## 2024-03-21 PROCEDURE — 1158F ADVNC CARE PLAN TLK DOCD: CPT | Performed by: FAMILY MEDICINE

## 2024-03-21 PROCEDURE — 1157F ADVNC CARE PLAN IN RCRD: CPT | Performed by: FAMILY MEDICINE

## 2024-03-21 PROCEDURE — 99214 OFFICE O/P EST MOD 30 MIN: CPT | Performed by: FAMILY MEDICINE

## 2024-03-21 RX ORDER — SILDENAFIL 100 MG/1
50-100 TABLET, FILM COATED ORAL AS NEEDED
Qty: 10 TABLET | Refills: 5 | Status: SHIPPED | OUTPATIENT
Start: 2024-03-21 | End: 2024-03-25 | Stop reason: SDUPTHER

## 2024-03-21 ASSESSMENT — ACTIVITIES OF DAILY LIVING (ADL)
DRESSING: INDEPENDENT
BATHING: INDEPENDENT
DOING_HOUSEWORK: INDEPENDENT
TAKING_MEDICATION: INDEPENDENT
MANAGING_FINANCES: INDEPENDENT
GROCERY_SHOPPING: INDEPENDENT

## 2024-03-21 ASSESSMENT — PATIENT HEALTH QUESTIONNAIRE - PHQ9
1. LITTLE INTEREST OR PLEASURE IN DOING THINGS: NOT AT ALL
2. FEELING DOWN, DEPRESSED OR HOPELESS: NOT AT ALL
SUM OF ALL RESPONSES TO PHQ9 QUESTIONS 1 AND 2: 0

## 2024-03-21 ASSESSMENT — ENCOUNTER SYMPTOMS: BACK PAIN: 0

## 2024-03-21 NOTE — PROGRESS NOTES
Subjective   Reason for Visit: Troy Degroot is an 73 y.o. male here for a Medicare Wellness visit.          Reviewed all medications by prescribing practitioner or clinical pharmacist (such as prescriptions, OTCs, herbal therapies and supplements) and documented in the medical record.    HPI    Patient Care Team:  Cheo Gleason MD as PCP - General  Mary Ellen Benton DO as PCP - [employer] PCP     Review of Systems    Objective   Vitals:  There were no vitals taken for this visit.      Physical Exam    Assessment/Plan   Problem List Items Addressed This Visit    None

## 2024-03-21 NOTE — ASSESSMENT & PLAN NOTE
Most recent A1c:   Hemoglobin A1C (%)   Date Value   08/30/2023 6.1 (A)     Weight loss needed    You have pre-diabetic level blood sugar.  This means you are at risk for developing diabetes.  ;  I recommend you avoid processed carbohydrates and sugar containing foods/beverages.  If you must have a sweetened beverage, please use Stevia and avoid artificial sweeteners such as aspartame, sucralose, sweet n low, etc.;DRINK WATER!    A good diet plan to follow is the Mediterranean diet.  Use online search to read more about this.;    Another simple rule is to shop the grocery store perimeter -thus filling your cart with fresh fruits, vegetables, lean meats (fish, chicken); dairy, cheese, and nuts.  Avoid the isles where you find bagged and boxed items that are processed. (chips, crackers, cookies, candies, snacks, etc.);    Aerobic cardiovascular exercise 150min weekly is essential for good health, BP control, sugar metabolism, and weight management.;March 21, 2024

## 2024-03-21 NOTE — ASSESSMENT & PLAN NOTE
Blood pressure in office today was   BP Readings from Last 1 Encounters:   03/21/24 123/88   The goal range for blood pressure is below 130/80.   We will continue to monitor.   Continue losartan and chlorthalidone.

## 2024-03-21 NOTE — ASSESSMENT & PLAN NOTE
Polyp identified on colonoscopy on 4/2019; high grade dysplasia.  Due for repeat scope 2024 as 5 year interval.  He received text from GI -he will respond to schedule

## 2024-03-21 NOTE — ASSESSMENT & PLAN NOTE
Lab Results   Component Value Date    CHOL 133 08/30/2023    CHOL 136 09/15/2022    CHOL 134 07/27/2021     Lab Results   Component Value Date    HDL 55.0 08/30/2023    HDL 50.1 09/15/2022    HDL 53.6 07/27/2021     Lab Results   Component Value Date    TRIG 109 08/30/2023    TRIG 116 09/15/2022    TRIG 134 07/27/2021     Lab Results   Component Value Date    LDLF 56 08/30/2023   Stable  Continue fish oil and rosuvastatin

## 2024-03-21 NOTE — PROGRESS NOTES
Subjective   Reason for Visit: Troy Degroot is an 73 y.o. male here for a Medicare Wellness visit.       Reviewed all medications by prescribing practitioner or clinical pharmacist (such as prescriptions, OTCs, herbal therapies and supplements) and documented in the medical record.    He is following with Dr. Dumont, chiropractic, for his back pain. Treatment is going very well and he feels much better. He does not currently have a follow-up appointment but was told to call whenever symptoms begin to flare again.  He does know that he needs to work on weight loss. He is still wearing his CPAP routinely, no issues. He is walking a couple miles a day with his dogs. He does mention feeling short of breath recently but he believes that it is due to the cold temperatures while out on his walks.         Patient Care Team:  Cheo Gleason MD as PCP - General  Mary Ellen Benton DO as PCP - [employer]vantage PCP     Review of Systems   Musculoskeletal:  Negative for back pain.       Objective   Vitals:  /88 (BP Location: Right arm, Patient Position: Sitting, BP Cuff Size: Adult)   Pulse 78   Temp 36.7 °C (98 °F) (Temporal)   Resp 14   Wt 91.2 kg (201 lb)   SpO2 97%   BMI 34.50 kg/m²       Physical Exam  Constitutional:       Appearance: Normal appearance. He is obese.   HENT:      Head: Normocephalic.      Mouth/Throat:      Mouth: Mucous membranes are moist.      Pharynx: Oropharynx is clear.   Eyes:      Conjunctiva/sclera: Conjunctivae normal.   Cardiovascular:      Rate and Rhythm: Normal rate and regular rhythm.      Pulses: Normal pulses.      Heart sounds: Normal heart sounds.   Pulmonary:      Effort: Pulmonary effort is normal.      Breath sounds: Normal breath sounds.   Abdominal:      General: Abdomen is flat. Bowel sounds are normal.      Palpations: Abdomen is soft.   Skin:     General: Skin is warm and dry.   Neurological:      General: No focal deficit present.      Mental  Status: He is alert.   Psychiatric:         Mood and Affect: Mood normal.         Behavior: Behavior normal.         Thought Content: Thought content normal.         Judgment: Judgment normal.         Assessment/Plan   Problem List Items Addressed This Visit       Adenomatous colon polyp    Current Assessment & Plan     Polyp identified on colonoscopy on 4/2019; high grade dysplasia.  Due for repeat scope 2024 as 5 year interval.  He received text from GI -he will respond to schedule         Benign essential hypertension    Current Assessment & Plan     Blood pressure in office today was   BP Readings from Last 1 Encounters:   03/21/24 123/88   The goal range for blood pressure is below 130/80.   We will continue to monitor.   Continue losartan and chlorthalidone.         Relevant Orders    Comprehensive Metabolic Panel    Erectile dysfunction    Current Assessment & Plan     Continue viagra prn  Refill sent         Relevant Medications    sildenafil (Viagra) 100 mg tablet    Gastroesophageal reflux disease without esophagitis    Current Assessment & Plan     Stable  Continue famotidine 40 mg daily.         Mixed hyperlipidemia    Current Assessment & Plan     Lab Results   Component Value Date    CHOL 133 08/30/2023    CHOL 136 09/15/2022    CHOL 134 07/27/2021     Lab Results   Component Value Date    HDL 55.0 08/30/2023    HDL 50.1 09/15/2022    HDL 53.6 07/27/2021     Lab Results   Component Value Date    TRIG 109 08/30/2023    TRIG 116 09/15/2022    TRIG 134 07/27/2021     Lab Results   Component Value Date    LDLF 56 08/30/2023   Stable  Continue fish oil and rosuvastatin         Relevant Orders    Lipid Panel    Comprehensive Metabolic Panel    TORREY on CPAP    Current Assessment & Plan     Stable.  Continue CPAP nightly.         Prediabetes    Current Assessment & Plan     Most recent A1c:   Hemoglobin A1C (%)   Date Value   08/30/2023 6.1 (A)   Weight loss needed    You have pre-diabetic level blood sugar.   This means you are at risk for developing diabetes.  ;  I recommend you avoid processed carbohydrates and sugar containing foods/beverages.  If you must have a sweetened beverage, please use Stevia and avoid artificial sweeteners such as aspartame, sucralose, sweet n low, etc.;DRINK WATER!    A good diet plan to follow is the Mediterranean diet.  Use online search to read more about this.;    Another simple rule is to shop the grocery store perimeter -thus filling your cart with fresh fruits, vegetables, lean meats (fish, chicken); dairy, cheese, and nuts.  Avoid the isles where you find bagged and boxed items that are processed. (chips, crackers, cookies, candies, snacks, etc.);    Aerobic cardiovascular exercise 150min weekly is essential for good health, BP control, sugar metabolism, and weight management.;March 21, 2024         Relevant Orders    Comprehensive Metabolic Panel    Hemoglobin A1C    S/P CABG x 4    Current Assessment & Plan     bypass April 2019 consisting of a LIMA to the LAD, SVG to the diagonal, marginal, and PDA.     Monitored by cardiolgy Dr Dougherty  Stable  No anginal symptoms         Spasm of muscle of lower back    Current Assessment & Plan     Chiropractic dry needling effective  Stable currently             Other Visit Diagnoses       Routine general medical examination at health care facility    -  Primary    Screening for multiple conditions        Prostate cancer screening        Relevant Orders    Prostate Specific Antigen, Screen            Scribe Attestation  By signing my name below, I, Sima Luz   attest that this documentation has been prepared under the direction and in the presence of Cheo Gleason MD.

## 2024-03-25 ENCOUNTER — TELEPHONE (OUTPATIENT)
Dept: PRIMARY CARE | Facility: CLINIC | Age: 74
End: 2024-03-25

## 2024-03-25 DIAGNOSIS — N52.9 ERECTILE DYSFUNCTION, UNSPECIFIED ERECTILE DYSFUNCTION TYPE: ICD-10-CM

## 2024-03-25 RX ORDER — SILDENAFIL 100 MG/1
50-100 TABLET, FILM COATED ORAL AS NEEDED
Qty: 10 TABLET | Refills: 5 | Status: SHIPPED | OUTPATIENT
Start: 2024-03-25

## 2024-03-25 NOTE — TELEPHONE ENCOUNTER
Pt called to get his medications sent to another pharmacy. Pt's Viagra was sent to [facility] on 3/21/24. Pt called and canceled it. Can it be sent to  in [place].

## 2024-04-01 DIAGNOSIS — I10 BENIGN ESSENTIAL HYPERTENSION: ICD-10-CM

## 2024-04-01 DIAGNOSIS — K21.9 GASTROESOPHAGEAL REFLUX DISEASE WITHOUT ESOPHAGITIS: ICD-10-CM

## 2024-04-01 DIAGNOSIS — E78.2 MIXED HYPERLIPIDEMIA: ICD-10-CM

## 2024-04-02 RX ORDER — ROSUVASTATIN CALCIUM 20 MG/1
TABLET, COATED ORAL
Qty: 45 TABLET | Refills: 1 | Status: SHIPPED | OUTPATIENT
Start: 2024-04-02

## 2024-04-02 RX ORDER — LOSARTAN POTASSIUM 25 MG/1
TABLET ORAL
Qty: 90 TABLET | Refills: 1 | Status: SHIPPED | OUTPATIENT
Start: 2024-04-02

## 2024-04-02 RX ORDER — CHLORTHALIDONE 25 MG/1
TABLET ORAL
Qty: 90 TABLET | Refills: 1 | Status: SHIPPED | OUTPATIENT
Start: 2024-04-02

## 2024-04-02 RX ORDER — FAMOTIDINE 40 MG/1
TABLET, FILM COATED ORAL
Qty: 90 TABLET | Refills: 1 | Status: SHIPPED | OUTPATIENT
Start: 2024-04-02

## 2024-04-15 ENCOUNTER — TELEPHONE (OUTPATIENT)
Dept: PRIMARY CARE | Facility: CLINIC | Age: 74
End: 2024-04-15
Payer: MEDICARE

## 2024-04-15 DIAGNOSIS — K21.9 GASTROESOPHAGEAL REFLUX DISEASE WITHOUT ESOPHAGITIS: ICD-10-CM

## 2024-04-15 DIAGNOSIS — E78.2 MIXED HYPERLIPIDEMIA: ICD-10-CM

## 2024-04-15 DIAGNOSIS — I10 BENIGN ESSENTIAL HYPERTENSION: Primary | ICD-10-CM

## 2024-04-15 RX ORDER — ROSUVASTATIN CALCIUM 20 MG/1
20 TABLET, COATED ORAL DAILY
Qty: 30 TABLET | Refills: 0 | Status: SHIPPED | OUTPATIENT
Start: 2024-04-15

## 2024-04-15 RX ORDER — FAMOTIDINE 40 MG/1
40 TABLET, FILM COATED ORAL 2 TIMES DAILY
Qty: 60 TABLET | Refills: 0 | Status: SHIPPED | OUTPATIENT
Start: 2024-04-15 | End: 2024-05-15

## 2024-04-15 RX ORDER — CHLORTHALIDONE 25 MG/1
25 TABLET ORAL DAILY
Qty: 30 TABLET | Refills: 0 | Status: SHIPPED | OUTPATIENT
Start: 2024-04-15 | End: 2024-05-15

## 2024-04-15 RX ORDER — LOSARTAN POTASSIUM 25 MG/1
25 TABLET ORAL DAILY
Qty: 30 TABLET | Refills: 0 | Status: SHIPPED | OUTPATIENT
Start: 2024-04-15 | End: 2024-05-15

## 2024-04-15 NOTE — TELEPHONE ENCOUNTER
[facility], he has not received them and has been all out of his medications. Patient is asking that a short supply of these medications be sent to the  pharmacy on [address] In [place].     Famotidine 40 mg   Rosuvastatin 20 mg   Losartan 25 mg   Chlorthalidone 25 mg

## 2024-08-20 PROBLEM — E78.2 MIXED HYPERLIPIDEMIA: Chronic | Status: ACTIVE | Noted: 2023-02-08

## 2024-08-20 PROBLEM — I25.10 CAD (CORONARY ARTERY DISEASE): Chronic | Status: ACTIVE | Noted: 2023-02-08

## 2024-08-20 PROBLEM — Z95.1 S/P CABG X 4: Chronic | Status: ACTIVE | Noted: 2023-02-08

## 2024-08-20 PROBLEM — G47.33 OBSTRUCTIVE SLEEP APNEA ON CPAP: Chronic | Status: ACTIVE | Noted: 2023-02-08

## 2024-08-30 ENCOUNTER — LAB (OUTPATIENT)
Dept: LAB | Facility: LAB | Age: 74
End: 2024-08-30
Payer: MEDICARE

## 2024-08-30 DIAGNOSIS — E78.2 MIXED HYPERLIPIDEMIA: ICD-10-CM

## 2024-08-30 DIAGNOSIS — R73.03 PREDIABETES: ICD-10-CM

## 2024-08-30 DIAGNOSIS — Z12.5 PROSTATE CANCER SCREENING: ICD-10-CM

## 2024-08-30 DIAGNOSIS — I10 BENIGN ESSENTIAL HYPERTENSION: ICD-10-CM

## 2024-08-30 LAB
ALBUMIN SERPL BCP-MCNC: 4.8 G/DL (ref 3.4–5)
ALP SERPL-CCNC: 68 U/L (ref 33–136)
ALT SERPL W P-5'-P-CCNC: 41 U/L (ref 10–52)
ANION GAP SERPL CALC-SCNC: 19 MMOL/L (ref 10–20)
AST SERPL W P-5'-P-CCNC: 28 U/L (ref 9–39)
BILIRUB SERPL-MCNC: 0.8 MG/DL (ref 0–1.2)
BUN SERPL-MCNC: 17 MG/DL (ref 6–23)
CALCIUM SERPL-MCNC: 9.9 MG/DL (ref 8.6–10.6)
CHLORIDE SERPL-SCNC: 99 MMOL/L (ref 98–107)
CHOLEST SERPL-MCNC: 130 MG/DL (ref 0–199)
CHOLESTEROL/HDL RATIO: 2.9
CO2 SERPL-SCNC: 26 MMOL/L (ref 21–32)
CREAT SERPL-MCNC: 1.05 MG/DL (ref 0.5–1.3)
EGFRCR SERPLBLD CKD-EPI 2021: 75 ML/MIN/1.73M*2
EST. AVERAGE GLUCOSE BLD GHB EST-MCNC: 128 MG/DL
GLUCOSE SERPL-MCNC: 132 MG/DL (ref 74–99)
HBA1C MFR BLD: 6.1 %
HDLC SERPL-MCNC: 44.9 MG/DL
LDLC SERPL CALC-MCNC: 50 MG/DL
NON HDL CHOLESTEROL: 85 MG/DL (ref 0–149)
POTASSIUM SERPL-SCNC: 3.7 MMOL/L (ref 3.5–5.3)
PROT SERPL-MCNC: 7.7 G/DL (ref 6.4–8.2)
PSA SERPL-MCNC: 0.84 NG/ML
SODIUM SERPL-SCNC: 140 MMOL/L (ref 136–145)
TRIGL SERPL-MCNC: 174 MG/DL (ref 0–149)
VLDL: 35 MG/DL (ref 0–40)

## 2024-08-30 PROCEDURE — 83036 HEMOGLOBIN GLYCOSYLATED A1C: CPT

## 2024-08-30 PROCEDURE — 36415 COLL VENOUS BLD VENIPUNCTURE: CPT

## 2024-08-30 PROCEDURE — 80053 COMPREHEN METABOLIC PANEL: CPT

## 2024-08-30 PROCEDURE — G0103 PSA SCREENING: HCPCS

## 2024-08-30 PROCEDURE — 80061 LIPID PANEL: CPT

## 2024-09-09 ENCOUNTER — OFFICE VISIT (OUTPATIENT)
Dept: CARDIOLOGY | Facility: CLINIC | Age: 74
End: 2024-09-09
Payer: MEDICARE

## 2024-09-09 VITALS
OXYGEN SATURATION: 96 % | SYSTOLIC BLOOD PRESSURE: 117 MMHG | HEART RATE: 84 BPM | WEIGHT: 198 LBS | DIASTOLIC BLOOD PRESSURE: 69 MMHG | BODY MASS INDEX: 33.99 KG/M2

## 2024-09-09 DIAGNOSIS — I25.10 CORONARY ARTERY DISEASE INVOLVING NATIVE CORONARY ARTERY OF NATIVE HEART WITHOUT ANGINA PECTORIS: Chronic | ICD-10-CM

## 2024-09-09 DIAGNOSIS — E78.2 MIXED HYPERLIPIDEMIA: Chronic | ICD-10-CM

## 2024-09-09 DIAGNOSIS — I10 BENIGN ESSENTIAL HYPERTENSION: Primary | Chronic | ICD-10-CM

## 2024-09-09 PROBLEM — M62.830 SPASM OF MUSCLE OF LOWER BACK: Status: RESOLVED | Noted: 2024-01-17 | Resolved: 2024-09-09

## 2024-09-09 PROBLEM — D12.6 ADENOMATOUS COLON POLYP: Status: RESOLVED | Noted: 2023-02-08 | Resolved: 2024-09-09

## 2024-09-09 PROCEDURE — 3078F DIAST BP <80 MM HG: CPT | Performed by: INTERNAL MEDICINE

## 2024-09-09 PROCEDURE — 1160F RVW MEDS BY RX/DR IN RCRD: CPT | Performed by: INTERNAL MEDICINE

## 2024-09-09 PROCEDURE — 93010 ELECTROCARDIOGRAM REPORT: CPT | Performed by: STUDENT IN AN ORGANIZED HEALTH CARE EDUCATION/TRAINING PROGRAM

## 2024-09-09 PROCEDURE — 1157F ADVNC CARE PLAN IN RCRD: CPT | Performed by: INTERNAL MEDICINE

## 2024-09-09 PROCEDURE — 93005 ELECTROCARDIOGRAM TRACING: CPT | Performed by: INTERNAL MEDICINE

## 2024-09-09 PROCEDURE — 1036F TOBACCO NON-USER: CPT | Performed by: INTERNAL MEDICINE

## 2024-09-09 PROCEDURE — 99213 OFFICE O/P EST LOW 20 MIN: CPT | Performed by: INTERNAL MEDICINE

## 2024-09-09 PROCEDURE — 3074F SYST BP LT 130 MM HG: CPT | Performed by: INTERNAL MEDICINE

## 2024-09-09 PROCEDURE — 1159F MED LIST DOCD IN RCRD: CPT | Performed by: INTERNAL MEDICINE

## 2024-09-09 NOTE — PROGRESS NOTES
Referred by No ref. provider found    HPI Feeling well. Not as active due to his back. No CP SOB.  He is walking 1-1/2 miles on a daily basis without difficulty.    Past Medical History:  Problem List Items Addressed This Visit    None     Past Medical History:   Diagnosis Date    Abnormal findings on diagnostic imaging of other parts of musculoskeletal system 04/06/2021    Abnormal MRI, spine    Abnormal levels of other serum enzymes 08/26/2020    Elevated CPK    Abnormal result of other cardiovascular function study 03/22/2019    Abnormal stress test    Atherosclerotic heart disease of native coronary artery without angina pectoris 08/29/2019    Coronary artery calcification    CAD (coronary artery disease) 02/08/2023    CABG x4 04/17/2019: JAIR-LAD, SVG to diag, SVG-OM1, SVG-RCA        Cellulitis of unspecified toe 06/27/2016    Paronychia of toe    Cervical disc disorder with radiculopathy, unspecified cervical region 04/06/2021    Herniation of cervical intervertebral disc with radiculopathy    Cervical root disorders, not elsewhere classified 04/06/2021    Cervical nerve root impingement    COVID-19 06/15/2022    COVID-19 virus infection    Encounter for general adult medical examination without abnormal findings 03/20/2020    Encounter for Medicare annual wellness exam    Encounter for immunization 03/04/2016    Need for vaccination    Encounter for immunization 02/12/2020    Immunization due    Encounter for screening for malignant neoplasm of colon 02/28/2019    Screening for colorectal cancer    Encounter for screening for other disorder 02/28/2021    Special screening for other conditions    Fatty (change of) liver, not elsewhere classified 05/07/2019    Fatty liver    Low back pain, unspecified 03/05/2018    Acute low back pain    Lumbago with sciatica, left side 04/06/2021    Chronic left-sided low back pain with left-sided sciatica    Lumbago with sciatica, right side 03/05/2018    Right-sided low back  pain with sciatica    Mixed hyperlipidemia 02/08/2023    Dr. Dougherty follows      Muscle spasm of back 12/01/2021    Spasm of lumbar paraspinous muscle    Obstructive sleep apnea on CPAP 02/08/2023    Autopap 5-20cm      Other conditions influencing health status 08/21/2018    History of traveling    Other conditions influencing health status 03/26/2021    History of traveling    Other hypersomnia 03/10/2017    Daytime hypersomnolence    Other specified personal risk factors, not elsewhere classified 02/22/2019    Pneumococcal vaccination indicated    Other specified personal risk factors, not elsewhere classified 03/10/2017    Pneumococcal vaccination indicated    Other symptoms and signs involving the musculoskeletal system 04/06/2021    Left leg weakness    Personal history of diseases of the blood and blood-forming organs and certain disorders involving the immune mechanism 05/03/2019    History of anemia    Personal history of other diseases of the musculoskeletal system and connective tissue 05/07/2019    History of back pain    Personal history of other diseases of the musculoskeletal system and connective tissue 03/17/2021    History of muscle spasm    Personal history of other diseases of the nervous system and sense organs 03/06/2015    History of impacted cerumen    Personal history of other diseases of the nervous system and sense organs 06/22/2021    History of chalazion    Personal history of other diseases of the nervous system and sense organs 03/31/2022    History of chalazion    Personal history of other diseases of the respiratory system 05/07/2019    History of acute bronchitis    Personal history of other specified conditions 01/31/2020    History of unsteady gait    Personal history of other specified conditions 03/22/2019    History of chest pain    Personal history of other specified conditions 09/09/2016    History of vertigo    Personal history of other specified conditions 01/31/2020     History of fatigue    Radiculopathy, cervical region 08/30/2021    Cervical radiculopathy    Radiculopathy, lumbar region 08/23/2021    Lumbar radiculopathy    S/P CABG x 4 02/08/2023    CABG x4 04/17/2019: JAIR-LAD, SVG to diag, SVG-OM1, SVG-RCA        Strain of muscle and tendon of front wall of thorax, initial encounter 03/02/2020    Strain of right pectoralis muscle    Unspecified contact dermatitis due to plants, except food 06/08/2021    Rhus dermatitis      Past Surgical History:  He has a past surgical history that includes Other surgical history (05/07/2019); Other surgical history (06/17/2021); Other surgical history (06/17/2021); Colonoscopy (05/07/2019); Tonsillectomy (05/07/2019); Other surgical history (05/07/2019); and Other surgical history (05/07/2019).      Social History:  He reports that he has never smoked. He has never been exposed to tobacco smoke. He has never used smokeless tobacco. He reports current alcohol use. He reports that he does not use drugs.    Family History:  Family History   Problem Relation Name Age of Onset    Other (cath stent placement) Mother      Cancer Father      Esophageal cancer Father       Allergies:  Amlodipine and Doxycycline    Outpatient Medications:  Current Outpatient Medications   Medication Instructions    acetaminophen (Tylenol) 325 mg tablet 2 tablets, oral, Every 6 hours    ASPIRIN ORAL 1 tablet, oral, Daily    chlorthalidone (Hygroton) 25 mg tablet TAKE 1 TABLET DAILY    chlorthalidone (HYGROTON) 25 mg, oral, Daily    cholecalciferol, vitamin D3, (VITAMIN D3 ORAL) oral    coenzyme Q-10 200 mg capsule oral    famotidine (Pepcid) 40 mg tablet TAKE 1 TABLET DAILY    famotidine (PEPCID) 40 mg, oral, 2 times daily    losartan (Cozaar) 25 mg tablet TAKE 1 TABLET DAILY    losartan (COZAAR) 25 mg, oral, Daily    omega 3-dha-epa-fish oil (Fish OiL) 1,200 (144-216) mg capsule oral    rosuvastatin (Crestor) 20 mg tablet TAKE ONE-HALF (1/2) TABLET DAILY     rosuvastatin (CRESTOR) 20 mg, oral, Daily    sildenafil (VIAGRA)  mg, oral, As needed, 1 hour before needed    turmeric root extract 500 mg tablet oral     Last Recorded Vitals:  There were no vitals filed for this visit.    Physical Exam  Patient is alert and oriented x3.  HEENT is unremarkable mucous members are moist  Neck no JVP no bruits upstrokes are full no thyromegaly  Lungs are clear bilaterally.  No wheezing crackles or rales  Heart regular rhythm normal S1-S2 there is no S3 no murmurs are heard.  Abdomen is soft bs are positive nontender nondistended no organomegaly no pulsatile masses  Extremities have no edema.  Distal pulses present palpable.  Neuro is grossly nonfocal  Skin has no rashes     Last Labs:  CBC -  Lab Results   Component Value Date    WBC 7.8 08/30/2023    HGB 15.2 08/30/2023    HCT 44.9 08/30/2023    MCV 93 08/30/2023     08/30/2023     CMP -  Lab Results   Component Value Date    CALCIUM 9.9 08/30/2024    PHOS 3.7 04/22/2019    PROT 7.7 08/30/2024    ALBUMIN 4.8 08/30/2024    AST 28 08/30/2024    ALT 41 08/30/2024    ALKPHOS 68 08/30/2024    BILITOT 0.8 08/30/2024     LIPID PANEL -   Lab Results   Component Value Date    CHOL 130 08/30/2024    HDL 44.9 08/30/2024    CHHDL 2.9 08/30/2024    VLDL 35 08/30/2024    TRIG 174 (H) 08/30/2024    NHDL 85 08/30/2024     RENAL FUNCTION PANEL -   Lab Results   Component Value Date    K 3.7 08/30/2024    PHOS 3.7 04/22/2019       Lab Results   Component Value Date    HGBA1C 6.1 (H) 08/30/2024     Procedure  EX NST [04/17/2023]: 8 min (10.10 METs) . . . Normal â€“ 73% Average capacity for age     REG STRESS (05/24/2019): 6 min 5 sec (METS 7.10) Baseline nonspecific ST-T wave abnormalities reduce the diagnostic specificity of the stress induced changes. ECG changes consistent w/ischemia. POSITIVE stress      CABG x4 (04/17/2019): JAIR to LAD, vein graft to diagonal, vein graft to obtuse marginal, vein graft to the distal RCA     CATH  (04/16/2019): Mild-Mod coronary calcification. L-main distal 30-40% tapering. Mod calcification. LAD Mod 60-70% Mid vessel stenosis. Ostial diag branch 90% stenosis. In the DE GUZMAN caudal views, Prox-LAD looks to have severe stenosis. Circumflex luminal irreg. RCA Severe ostial stenosis followed by subtotal occlusion. Mod sz dom. vessel.      CAROTID (04/16/2019): Less than 50% SLOAN / LICA     ECHO (04/16/2019): EF 60-65% = normal     FFR/IVUS (04/16/2019): FFR significantly POSITIVE @ 0.63     REG STRESS (03/21/2019): 8 min (METS 9.1) Normal      CAC (03/06/2019) = 1313.87 (LM 20.96, .66 prox and mid, LCx 184.15 scattered throughout its course, .1 scattered throughout its course)        Assessment/Plan     1. CAD. 2019 he had bypass with a LIMA to the LAD, SVG to the diagonal, marginal, and PDA. Doing well. Exercise nuclear stress testing 4/17/2023 revealed average capacity for age. He had subtle EKG changes. No symptoms. Nuclear imaging entirely normal. Continue with aspirin, losartan, and rosuvastatin.     2. Hyperlipidemia. Followed by Dr. Gleason.  8/30/2024 LDL 50 HDL 45 triglycerides 174 LFTs normal blood sugar 132.  These results are excellent     3. Hypertension. Much better today. At home typically in [id number]     EKG today.  Return 1 year.  Sooner if any issues arise  Instructions and follow up

## 2024-09-09 NOTE — PATIENT INSTRUCTIONS
1. CAD. 2019 he had bypass with a LIMA to the LAD, SVG to the diagonal, marginal, and PDA. Doing well. Exercise nuclear stress testing 4/17/2023 revealed average capacity for age. He had subtle EKG changes. No symptoms. Nuclear imaging entirely normal. Continue with aspirin, losartan, and rosuvastatin.     2. Hyperlipidemia. Followed by Dr. Gleason.  8/30/2024 LDL 50 HDL 45 triglycerides 174 LFTs normal blood sugar 132.  These results are excellent     3. Hypertension. Much better today. At home typically in [id number]'s     EKG today.  Return 1 year.  Sooner if any issues arise  Instructions and follow up

## 2024-09-13 LAB
Q ONSET: 222 MS
QRS COUNT: 11 BEATS
QRS DURATION: 96 MS
QT INTERVAL: 390 MS
QTC CALCULATION(BAZETT): 421 MS
QTC FREDERICIA: 410 MS
R AXIS: 59 DEGREES
T AXIS: 55 DEGREES
T OFFSET: 417 MS
VENTRICULAR RATE: 70 BPM

## 2024-09-19 ENCOUNTER — APPOINTMENT (OUTPATIENT)
Dept: PRIMARY CARE | Facility: CLINIC | Age: 74
End: 2024-09-19
Payer: MEDICARE

## 2024-09-19 VITALS
TEMPERATURE: 96.6 F | WEIGHT: 197 LBS | DIASTOLIC BLOOD PRESSURE: 79 MMHG | SYSTOLIC BLOOD PRESSURE: 138 MMHG | HEART RATE: 77 BPM | OXYGEN SATURATION: 94 % | BODY MASS INDEX: 33.81 KG/M2 | RESPIRATION RATE: 14 BRPM

## 2024-09-19 DIAGNOSIS — Z95.1 S/P CABG X 4: Chronic | ICD-10-CM

## 2024-09-19 DIAGNOSIS — G47.33 OBSTRUCTIVE SLEEP APNEA ON CPAP: Chronic | ICD-10-CM

## 2024-09-19 DIAGNOSIS — R73.03 PREDIABETES: ICD-10-CM

## 2024-09-19 DIAGNOSIS — E78.2 MIXED HYPERLIPIDEMIA: Chronic | ICD-10-CM

## 2024-09-19 DIAGNOSIS — M48.062 SPINAL STENOSIS OF LUMBAR REGION WITH NEUROGENIC CLAUDICATION: ICD-10-CM

## 2024-09-19 DIAGNOSIS — I10 BENIGN ESSENTIAL HYPERTENSION: Primary | Chronic | ICD-10-CM

## 2024-09-19 DIAGNOSIS — I25.10 CORONARY ARTERY DISEASE INVOLVING NATIVE CORONARY ARTERY OF NATIVE HEART WITHOUT ANGINA PECTORIS: Chronic | ICD-10-CM

## 2024-09-19 DIAGNOSIS — K21.9 GASTROESOPHAGEAL REFLUX DISEASE WITHOUT ESOPHAGITIS: ICD-10-CM

## 2024-09-19 PROBLEM — H81.10 BENIGN PAROXYSMAL POSITIONAL VERTIGO: Status: RESOLVED | Noted: 2023-02-08 | Resolved: 2024-09-19

## 2024-09-19 PROCEDURE — 1036F TOBACCO NON-USER: CPT | Performed by: FAMILY MEDICINE

## 2024-09-19 PROCEDURE — 3078F DIAST BP <80 MM HG: CPT | Performed by: FAMILY MEDICINE

## 2024-09-19 PROCEDURE — 1157F ADVNC CARE PLAN IN RCRD: CPT | Performed by: FAMILY MEDICINE

## 2024-09-19 PROCEDURE — 1160F RVW MEDS BY RX/DR IN RCRD: CPT | Performed by: FAMILY MEDICINE

## 2024-09-19 PROCEDURE — 3075F SYST BP GE 130 - 139MM HG: CPT | Performed by: FAMILY MEDICINE

## 2024-09-19 PROCEDURE — 99214 OFFICE O/P EST MOD 30 MIN: CPT | Performed by: FAMILY MEDICINE

## 2024-09-19 PROCEDURE — 1123F ACP DISCUSS/DSCN MKR DOCD: CPT | Performed by: FAMILY MEDICINE

## 2024-09-19 PROCEDURE — 1159F MED LIST DOCD IN RCRD: CPT | Performed by: FAMILY MEDICINE

## 2024-09-19 PROCEDURE — 1158F ADVNC CARE PLAN TLK DOCD: CPT | Performed by: FAMILY MEDICINE

## 2024-09-19 RX ORDER — GLUCOSAM/CHONDRO/HERB 149/HYAL 750-100 MG
TABLET ORAL
COMMUNITY
End: 2024-09-19 | Stop reason: WASHOUT

## 2024-09-19 RX ORDER — LIDOCAINE 50 MG/G
1 PATCH TOPICAL DAILY
Qty: 30 PATCH | Refills: 0 | Status: SHIPPED | OUTPATIENT
Start: 2024-09-19 | End: 2025-09-19

## 2024-09-19 ASSESSMENT — PATIENT HEALTH QUESTIONNAIRE - PHQ9
1. LITTLE INTEREST OR PLEASURE IN DOING THINGS: NOT AT ALL
SUM OF ALL RESPONSES TO PHQ9 QUESTIONS 1 AND 2: 0
2. FEELING DOWN, DEPRESSED OR HOPELESS: NOT AT ALL

## 2024-09-19 ASSESSMENT — ENCOUNTER SYMPTOMS
BACK PAIN: 1
ABDOMINAL PAIN: 1

## 2024-09-19 NOTE — PROGRESS NOTES
"Subjective     Patient ID: Troy Degroot \"Jj" is a 73 y.o. male who presents for follow up of chronic medical conditions.    Decline flu shot at this time- gets them done at the pharmacy.     High blood pressure evaluation.     Review of symptoms:  Fatigue: n  Headaches:  n  Blurred vision:  n  Palpitations:n   Chest pains: n  Shortness of Breath:n  Swelling of legs: n  Blood in urine: n  Taking medications daily: y  Medication side effects:n  Problems with medication compliance:N  Baby Aspirin 81 mg daily:  Y    He recently saw Dr. Dougherty, cardiology, on 9/9. He remains stable and will continue following up, his next appointment is scheduled 9/15/2025.  He is trying to exercise more, he states that his back pain will slow him down a bit. He enjoys taking his dogs to the park and walking around while they run. He is wondering if lidocaine patches will help calm the pain when it flares. He has never had an injection into his back, he is not ready to start looking into that yet because it is not a constant bother and not effecting his life much. He feels a flare of pain mainly when doing house work.   He has started experiencing heartburn more frequently, several times per week and sometimes several times per day. He takes famotidine daily but only one pill, he will take a second pill 1-2 times per week. He also believes that he is slightly lactose intolerant because he has noticed that cottage cheese will cause heartburn.         Review of Systems   Gastrointestinal:  Positive for abdominal pain (heartburn).   Musculoskeletal:  Positive for back pain.   All other systems reviewed and are negative.      Objective   /79 (BP Location: Right arm, Patient Position: Sitting, BP Cuff Size: Adult)   Pulse 77   Temp 35.9 °C (96.6 °F) (Temporal)   Resp 14   Wt 89.4 kg (197 lb)   SpO2 94%   BMI 33.81 kg/m²     Physical Exam  Constitutional:       Appearance: Normal appearance. He is overweight.   HENT:      " Head: Normocephalic.   Eyes:      Conjunctiva/sclera: Conjunctivae normal.   Cardiovascular:      Rate and Rhythm: Normal rate and regular rhythm.      Pulses: Normal pulses.      Heart sounds: Normal heart sounds.   Pulmonary:      Effort: Pulmonary effort is normal.      Breath sounds: Normal breath sounds.   Abdominal:      General: Abdomen is flat. Bowel sounds are normal.      Palpations: Abdomen is soft.      Tenderness: There is no abdominal tenderness.   Musculoskeletal:      Cervical back: Normal range of motion.   Neurological:      General: No focal deficit present.      Mental Status: He is alert.   Psychiatric:         Mood and Affect: Mood normal.         Behavior: Behavior normal.         Thought Content: Thought content normal.         Judgment: Judgment normal.         Assessment/Plan   Problem List Items Addressed This Visit       Benign essential hypertension - Primary (Chronic)     Blood pressure in office today was   BP Readings from Last 1 Encounters:   09/19/24 138/79   The goal range for blood pressure is below 130/80.   We will continue to monitor.   Continue losartan and chlorthalidone.         CAD (coronary artery disease) (Chronic)     Follows with Dr. Dougherty - most recent appt 9/9/24:  1. CAD. 2019 he had bypass with a LIMA to the LAD, SVG to the diagonal, marginal, and PDA. Doing well. Exercise nuclear stress testing 4/17/2023 revealed average capacity for age. He had subtle EKG changes. No symptoms. Nuclear imaging entirely normal. Continue with aspirin, losartan, and rosuvastatin.          Gastroesophageal reflux disease without esophagitis     Flaring more frequently.  Taking one famotidine daily and an extra pill 1-2 times per week as needed.         Mixed hyperlipidemia (Chronic)     Lab Results   Component Value Date    CHOL 130 08/30/2024    CHOL 133 08/30/2023    CHOL 136 09/15/2022     Lab Results   Component Value Date    HDL 44.9 08/30/2024    HDL 55.0 08/30/2023    HDL 50.1  09/15/2022     Lab Results   Component Value Date    LDLCALC 50 08/30/2024     Lab Results   Component Value Date    TRIG 174 (H) 08/30/2024    TRIG 109 08/30/2023    TRIG 116 09/15/2022   Stable  Continue fish oil and rosuvastatin - only taking 1/2 statin daily         Obstructive sleep apnea on CPAP (Chronic)     Stable.  Continue CPAP nightly.         Prediabetes     Most recent A1c:   Hemoglobin A1C (%)   Date Value   08/30/2024 6.1 (H)   Stable - 8/30/2023 was 6.1  Continue working on weight loss    You have pre-diabetic level blood sugar.  This means you are at risk for developing diabetes.  ;  I recommend you avoid processed carbohydrates and sugar containing foods/beverages.  If you must have a sweetened beverage, please use Stevia and avoid artificial sweeteners such as aspartame, sucralose, sweet n low, etc.;DRINK WATER!    A good diet plan to follow is the Mediterranean diet.  Use online search to read more about this.;    Another simple rule is to shop the grocery store perimeter -thus filling your cart with fresh fruits, vegetables, lean meats (fish, chicken); dairy, cheese, and nuts.  Avoid the isles where you find bagged and boxed items that are processed. (chips, crackers, cookies, candies, snacks, etc.);    Aerobic cardiovascular exercise 150min weekly is essential for good health, BP control, sugar metabolism, and weight management.;September 19, 2024         S/P CABG x 4 (Chronic)     bypass April 2019 consisting of a LIMA to the LAD, SVG to the diagonal, marginal, and PDA.     Monitored by cardiolgy Dr Dougherty  Stable  No anginal symptoms         Spinal stenosis of lumbar region with neurogenic claudication     Not life altering at this time, flares brought on dependent on posture.  Not ready to consider injections at this time.  Rx sent for lidocaine patches to be applied PRN         Relevant Medications    lidocaine (Lidoderm) 5 % patch     Follow up: Scheduled 3/12/2025    Scribe Attestation  By  signing my name below, I, Sima Devi   attest that this documentation has been prepared under the direction and in the presence of Cheo Gleason MD.

## 2024-09-19 NOTE — ASSESSMENT & PLAN NOTE
Blood pressure in office today was   BP Readings from Last 1 Encounters:   09/19/24 138/79   The goal range for blood pressure is below 130/80.   We will continue to monitor.   Continue losartan and chlorthalidone.

## 2024-09-19 NOTE — ASSESSMENT & PLAN NOTE
Not life altering at this time, flares brought on dependent on posture.  Not ready to consider injections at this time.  Rx sent for lidocaine patches to be applied PRN

## 2024-09-19 NOTE — ASSESSMENT & PLAN NOTE
Lab Results   Component Value Date    CHOL 130 08/30/2024    CHOL 133 08/30/2023    CHOL 136 09/15/2022     Lab Results   Component Value Date    HDL 44.9 08/30/2024    HDL 55.0 08/30/2023    HDL 50.1 09/15/2022     Lab Results   Component Value Date    LDLCALC 50 08/30/2024     Lab Results   Component Value Date    TRIG 174 (H) 08/30/2024    TRIG 109 08/30/2023    TRIG 116 09/15/2022   Stable  Continue fish oil and rosuvastatin - only taking 1/2 statin daily

## 2024-09-19 NOTE — ASSESSMENT & PLAN NOTE
Flaring more frequently.  Taking one famotidine daily and an extra pill 1-2 times per week as needed.

## 2024-09-20 ENCOUNTER — APPOINTMENT (OUTPATIENT)
Dept: PRIMARY CARE | Facility: CLINIC | Age: 74
End: 2024-09-20
Payer: MEDICARE

## 2024-09-25 ENCOUNTER — TELEPHONE (OUTPATIENT)
Dept: PRIMARY CARE | Facility: CLINIC | Age: 74
End: 2024-09-25
Payer: MEDICARE

## 2024-11-04 ENCOUNTER — APPOINTMENT (OUTPATIENT)
Dept: PRIMARY CARE | Facility: CLINIC | Age: 74
End: 2024-11-04
Payer: MEDICARE

## 2024-11-04 VITALS
DIASTOLIC BLOOD PRESSURE: 81 MMHG | OXYGEN SATURATION: 96 % | RESPIRATION RATE: 12 BRPM | TEMPERATURE: 98.3 F | BODY MASS INDEX: 33.2 KG/M2 | WEIGHT: 193.4 LBS | SYSTOLIC BLOOD PRESSURE: 136 MMHG | HEART RATE: 81 BPM

## 2024-11-04 DIAGNOSIS — R04.0 FREQUENT EPISTAXIS: Primary | ICD-10-CM

## 2024-11-04 PROCEDURE — 90662 IIV NO PRSV INCREASED AG IM: CPT | Performed by: FAMILY MEDICINE

## 2024-11-04 PROCEDURE — 99214 OFFICE O/P EST MOD 30 MIN: CPT | Performed by: FAMILY MEDICINE

## 2024-11-04 PROCEDURE — 1036F TOBACCO NON-USER: CPT | Performed by: FAMILY MEDICINE

## 2024-11-04 PROCEDURE — 3079F DIAST BP 80-89 MM HG: CPT | Performed by: FAMILY MEDICINE

## 2024-11-04 PROCEDURE — 1159F MED LIST DOCD IN RCRD: CPT | Performed by: FAMILY MEDICINE

## 2024-11-04 PROCEDURE — 3075F SYST BP GE 130 - 139MM HG: CPT | Performed by: FAMILY MEDICINE

## 2024-11-04 PROCEDURE — 1157F ADVNC CARE PLAN IN RCRD: CPT | Performed by: FAMILY MEDICINE

## 2024-11-04 PROCEDURE — G0008 ADMIN INFLUENZA VIRUS VAC: HCPCS | Performed by: FAMILY MEDICINE

## 2024-11-04 PROCEDURE — 1123F ACP DISCUSS/DSCN MKR DOCD: CPT | Performed by: FAMILY MEDICINE

## 2024-11-04 NOTE — PROGRESS NOTES
" Subjective   Patient ID: Troy Degroot \"Jj" is a 73 y.o. male who presents for Sinus Problem (Pt states bloody sinuses X1 mth but has had it on and off X1 year./Pt will take a flu vaccine today.).  HPI    Left sided nose bleeds. .  Recurrent over the year .  [place] last couple months. Blood in back of throat.   On CPAP , humidified,  not a new set up   Some of the bleeding in left is overnight .   On ASA 81 mg every day ,hx of CABG     No otc Nsaids       Review of Systems  Denies  facial trauma, dental pain , dental procedures.  Denies fever, chills,  sweats.  No facial pain.  Does not blow nose hard/ harshly . Does not use nasal spray .   No other bruising/ bleeding     Objective   /81 (BP Location: Right arm, Patient Position: Sitting, BP Cuff Size: Adult)   Pulse 81   Temp 36.8 °C (98.3 °F) (Temporal)   Resp 12   Wt 87.7 kg (193 lb 6.4 oz)   SpO2 96%   BMI 33.20 kg/m²     Physical Exam  Constitutional:       General: He is not in acute distress.  HENT:      Head: Normocephalic and atraumatic.      Right Ear: Tympanic membrane normal.      Left Ear: Tympanic membrane normal.      Nose: No congestion or rhinorrhea.   Eyes:      Conjunctiva/sclera: Conjunctivae normal.      Pupils: Pupils are equal, round, and reactive to light.   Cardiovascular:      Rate and Rhythm: Normal rate and regular rhythm.   Lymphadenopathy:      Cervical: No cervical adenopathy.   Neurological:      Mental Status: He is alert.         Assessment/Plan   Problem List Items Addressed This Visit    None  Visit Diagnoses       Frequent epistaxis    -  Primary    Relevant Orders    Referral to ENT            Recommend ENT consult .     YOUSIF Aburto MD  "

## 2024-11-11 ENCOUNTER — APPOINTMENT (OUTPATIENT)
Dept: OTOLARYNGOLOGY | Facility: CLINIC | Age: 74
End: 2024-11-11
Payer: MEDICARE

## 2024-11-11 VITALS — BODY MASS INDEX: 31.92 KG/M2 | HEIGHT: 64 IN | WEIGHT: 187 LBS

## 2024-11-11 DIAGNOSIS — R04.0 FREQUENT EPISTAXIS: ICD-10-CM

## 2024-11-11 PROCEDURE — 1157F ADVNC CARE PLAN IN RCRD: CPT | Performed by: PHYSICIAN ASSISTANT

## 2024-11-11 PROCEDURE — 1159F MED LIST DOCD IN RCRD: CPT | Performed by: PHYSICIAN ASSISTANT

## 2024-11-11 PROCEDURE — 1036F TOBACCO NON-USER: CPT | Performed by: PHYSICIAN ASSISTANT

## 2024-11-11 PROCEDURE — 31231 NASAL ENDOSCOPY DX: CPT | Performed by: PHYSICIAN ASSISTANT

## 2024-11-11 PROCEDURE — 99203 OFFICE O/P NEW LOW 30 MIN: CPT | Performed by: PHYSICIAN ASSISTANT

## 2024-11-11 PROCEDURE — 3008F BODY MASS INDEX DOCD: CPT | Performed by: PHYSICIAN ASSISTANT

## 2024-11-11 PROCEDURE — 1123F ACP DISCUSS/DSCN MKR DOCD: CPT | Performed by: PHYSICIAN ASSISTANT

## 2024-11-11 PROCEDURE — 30901 CONTROL OF NOSEBLEED: CPT | Performed by: PHYSICIAN ASSISTANT

## 2024-11-11 NOTE — PROGRESS NOTES
Troy Degroot is a 73 y.o. year old male patient with Epistaxis (Nose Bleed)       The patient presents to the office today for assessment of his nose.  He reports that he has been experiencing epistaxis.  He has not had any bleeding in 2 weeks.  He believes that the symptoms are on the left side.  He states that he has not had any significant bleeding out the front of his nose but has been having blood mostly clot draining down the back of the throat.  He is here today for further assessment.  Former smoker.  All other ENT issues are negative.    Review of Systems   All other systems reviewed and are negative.        Physical Exam:   General appearance: No acute distress. Normal facies. Symmetric facial movement. No gross lesions of the face are noted.  The external ear structures appear normal. The ear canals patent and the tympanic membranes are intact without evidence of air-fluid levels, retraction, or congenital defects.  Anterior rhinoscopy notes essentially a midline nasal septum.  The patient has very prominent arterial bleeding source identified to the left mid septum.  Examination is noted for normal healthy mucosal membranes without any evidence of lesions, polyps, or exudate. The tongue is normally mobile. There are no lesions on the gingiva, buccal, or oral mucosa. There are no oral cavity masses.  The neck is negative for mass lymphadenopathy. The trachea and parotid are clear. The thyroid bed is grossly unremarkable. The salivary gland structures are grossly unremarkable.    Following topical anesthesia spray with lidocaine and Afrin the patient underwent bilateral nasal endoscopy.  I do not appreciate any other worrisome findings in the nasal vault or in the posterior nasopharynx.  Prominent vessel again seen left mid septum.  There are no worrisome findings to the nasal turbinates.    Procedure:    Following topical anesthesia with lidocaine and Afrin the patient underwent chemical  cauterization with silver nitrate to the left mid septum where a prominent arterial vessel was identified.  Cauterization was completed nose was suctioned and debrided of thickened mucus and bleeding was under good control.    Assessment/Plan     1.  Epistaxis    Patient was seen in the office today for assessment of epistaxis.  Patient with prominent vessel identified to the left mid septum which is now status post chemical cauterization.  Exam did not show any other worrisome findings including nasal endoscopy.  I recommend nasal saline and avoidance of blowing his nose and he should sneeze with his mouth open for at least the next 5 days.  I will see him back should bleeding continue.    All questions and concerns were answered and addressed. The patient expresses understanding and will follow up as advised.    Thank you again for allowing us to participate in the care of this patient.

## 2024-12-25 DIAGNOSIS — E78.2 MIXED HYPERLIPIDEMIA: ICD-10-CM

## 2024-12-25 DIAGNOSIS — K21.9 GASTROESOPHAGEAL REFLUX DISEASE WITHOUT ESOPHAGITIS: ICD-10-CM

## 2024-12-25 DIAGNOSIS — I10 BENIGN ESSENTIAL HYPERTENSION: ICD-10-CM

## 2024-12-26 RX ORDER — ROSUVASTATIN CALCIUM 20 MG/1
20 TABLET, COATED ORAL DAILY
Qty: 45 TABLET | Refills: 1 | Status: SHIPPED | OUTPATIENT
Start: 2024-12-26

## 2024-12-26 RX ORDER — LOSARTAN POTASSIUM 25 MG/1
25 TABLET ORAL DAILY
Qty: 90 TABLET | Refills: 1 | Status: SHIPPED | OUTPATIENT
Start: 2024-12-26

## 2024-12-26 RX ORDER — FAMOTIDINE 40 MG/1
40 TABLET, FILM COATED ORAL DAILY
Qty: 90 TABLET | Refills: 1 | Status: SHIPPED | OUTPATIENT
Start: 2024-12-26

## 2024-12-26 RX ORDER — CHLORTHALIDONE 25 MG/1
25 TABLET ORAL DAILY
Qty: 90 TABLET | Refills: 1 | Status: SHIPPED | OUTPATIENT
Start: 2024-12-26

## 2025-01-07 ENCOUNTER — TELEPHONE (OUTPATIENT)
Dept: PRIMARY CARE | Facility: CLINIC | Age: 75
End: 2025-01-07

## 2025-01-07 ENCOUNTER — APPOINTMENT (OUTPATIENT)
Dept: PRIMARY CARE | Facility: CLINIC | Age: 75
End: 2025-01-07
Payer: MEDICARE

## 2025-01-07 ENCOUNTER — OFFICE VISIT (OUTPATIENT)
Dept: PRIMARY CARE | Facility: CLINIC | Age: 75
End: 2025-01-07
Payer: MEDICARE

## 2025-01-07 VITALS
SYSTOLIC BLOOD PRESSURE: 118 MMHG | BODY MASS INDEX: 33.06 KG/M2 | TEMPERATURE: 97.9 F | WEIGHT: 192.6 LBS | DIASTOLIC BLOOD PRESSURE: 67 MMHG | RESPIRATION RATE: 14 BRPM | HEART RATE: 64 BPM | OXYGEN SATURATION: 94 %

## 2025-01-07 DIAGNOSIS — J01.40 SUBACUTE PANSINUSITIS: Primary | ICD-10-CM

## 2025-01-07 DIAGNOSIS — R05.2 SUBACUTE COUGH: ICD-10-CM

## 2025-01-07 PROCEDURE — 1123F ACP DISCUSS/DSCN MKR DOCD: CPT | Performed by: STUDENT IN AN ORGANIZED HEALTH CARE EDUCATION/TRAINING PROGRAM

## 2025-01-07 PROCEDURE — 1160F RVW MEDS BY RX/DR IN RCRD: CPT | Performed by: STUDENT IN AN ORGANIZED HEALTH CARE EDUCATION/TRAINING PROGRAM

## 2025-01-07 PROCEDURE — 3074F SYST BP LT 130 MM HG: CPT | Performed by: STUDENT IN AN ORGANIZED HEALTH CARE EDUCATION/TRAINING PROGRAM

## 2025-01-07 PROCEDURE — 99213 OFFICE O/P EST LOW 20 MIN: CPT | Performed by: STUDENT IN AN ORGANIZED HEALTH CARE EDUCATION/TRAINING PROGRAM

## 2025-01-07 PROCEDURE — 3078F DIAST BP <80 MM HG: CPT | Performed by: STUDENT IN AN ORGANIZED HEALTH CARE EDUCATION/TRAINING PROGRAM

## 2025-01-07 PROCEDURE — 1159F MED LIST DOCD IN RCRD: CPT | Performed by: STUDENT IN AN ORGANIZED HEALTH CARE EDUCATION/TRAINING PROGRAM

## 2025-01-07 PROCEDURE — 1157F ADVNC CARE PLAN IN RCRD: CPT | Performed by: STUDENT IN AN ORGANIZED HEALTH CARE EDUCATION/TRAINING PROGRAM

## 2025-01-07 RX ORDER — BENZONATATE 200 MG/1
200 CAPSULE ORAL 3 TIMES DAILY PRN
Qty: 42 CAPSULE | Refills: 0 | Status: SHIPPED | OUTPATIENT
Start: 2025-01-07 | End: 2025-02-06

## 2025-01-07 RX ORDER — FLUTICASONE PROPIONATE 50 MCG
2 SPRAY, SUSPENSION (ML) NASAL NIGHTLY
Qty: 16 G | Refills: 2 | Status: SHIPPED | OUTPATIENT
Start: 2025-01-07

## 2025-01-07 RX ORDER — ALBUTEROL SULFATE 90 UG/1
2 INHALANT RESPIRATORY (INHALATION) EVERY 4 HOURS PRN
Qty: 8.5 G | Refills: 2 | Status: SHIPPED | OUTPATIENT
Start: 2025-01-07

## 2025-01-07 RX ORDER — AMOXICILLIN AND CLAVULANATE POTASSIUM 875; 125 MG/1; MG/1
875 TABLET, FILM COATED ORAL 2 TIMES DAILY
Qty: 20 TABLET | Refills: 0 | Status: SHIPPED | OUTPATIENT
Start: 2025-01-07 | End: 2025-01-17

## 2025-01-07 ASSESSMENT — ENCOUNTER SYMPTOMS
CHILLS: 0
RHINORRHEA: 1
COUGH: 1
SHORTNESS OF BREATH: 1
SORE THROAT: 0
FATIGUE: 1
SWEATS: 1

## 2025-01-07 NOTE — PROGRESS NOTES
FAMILY MEDICINE  OFFICE VISIT   Troy Degroot  [id number]  1950    PCP: Cheo Gleason MD     Chief Complaint:   Chief Complaint   Patient presents with    Cough    Nasal Congestion    Fatigue     + chest congestion x approx 5 weeks     SUBJECTIVE     Troy Degroot is a 74 y.o. English-speaking male, who presents to the clinic with complaints of cough and fatigue.    Cough  This is a recurrent problem. The cough is Productive of sputum (yellow). Associated symptoms include rhinorrhea, shortness of breath and sweats. Pertinent negatives include no chills, ear congestion, ear pain or sore throat.   Fatigue  Associated symptoms include coughing and fatigue. Pertinent negatives include no chills or sore throat.   - Started a little after Thanksgiving   - Bringing things up.   - Got COVID 2 weeks ago. Brother had it.   - Seems to be getting a bit better  - Feels like still has a heavy chest  - Still having fatigue and SOB  - Takes longer than it   - No antibiotics at all.   - Had feverish with COVID   - Has little energy   - Sinus pressure headaches  - Teeth have been sensitivite     The following portions of the patient's chart were reviewed in this encounter and updated as appropriate:  Tobacco  Allergies  Meds  Problems  Med Hx  Surg Hx  Fam Hx         Home Medication List:  Current Outpatient Medications   Medication Instructions    acetaminophen (Tylenol) 325 mg tablet 2 tablets, Every 6 hours    ASPIRIN ORAL 1 tablet, Daily    chlorthalidone (HYGROTON) 25 mg, oral, Daily    cholecalciferol, vitamin D3, (VITAMIN D3 ORAL) Take by mouth.    coenzyme Q-10 200 mg capsule Take by mouth.    famotidine (PEPCID) 40 mg, oral, Daily    lidocaine (Lidoderm) 5 % patch 1 patch, transdermal, Daily, Apply to painful area 12 hours per day, remove for 12 hours.    losartan (COZAAR) 25 mg, oral, Daily    omega 3-dha-epa-fish oil (Fish OiL) 1,200 (144-216) mg capsule Take by mouth.    rosuvastatin (CRESTOR) 20  mg, oral, Daily    sildenafil (VIAGRA)  mg, oral, As needed, 1 hour before needed    turmeric root extract 500 mg tablet Take by mouth.         OBJECTIVE   /67 (BP Location: Left arm, Patient Position: Sitting, BP Cuff Size: Adult)   Pulse 64   Temp 36.6 °C (97.9 °F) (Temporal)   Resp 14   Wt 87.4 kg (192 lb 9.6 oz)   SpO2 94%   BMI 33.06 kg/m²   Vital signs and pulse oximetry reviewed.     Physical Exam  Vitals and nursing note reviewed.   Constitutional:       General: He is not in acute distress.     Appearance: Normal appearance. He is not toxic-appearing or diaphoretic.   HENT:      Head: Normocephalic and atraumatic.      Right Ear: External ear normal. A middle ear effusion is present. Tympanic membrane is not scarred, perforated, erythematous, retracted or bulging.      Left Ear: External ear normal. A middle ear effusion is present. Tympanic membrane is not scarred, perforated, erythematous, retracted or bulging.      Nose: No congestion or rhinorrhea.      Right Turbinates: Enlarged and swollen.      Left Turbinates: Enlarged and swollen.      Right Sinus: Maxillary sinus tenderness and frontal sinus tenderness present.      Left Sinus: Maxillary sinus tenderness and frontal sinus tenderness present.   Eyes:      General: No scleral icterus.     Conjunctiva/sclera: Conjunctivae normal.   Cardiovascular:      Rate and Rhythm: Normal rate and regular rhythm.      Heart sounds: No murmur heard.  Pulmonary:      Effort: Pulmonary effort is normal. No respiratory distress.      Breath sounds: Normal breath sounds. No wheezing, rhonchi or rales.      Comments: Tight moving air in all fields, moving air. Cough present.  Abdominal:      General: Abdomen is flat. Bowel sounds are normal. There is no distension.      Tenderness: There is no abdominal tenderness. There is no guarding.      Hernia: No hernia is present.   Musculoskeletal:      Right lower leg: No edema.      Left lower leg: No edema.    Lymphadenopathy:      Cervical: No cervical adenopathy.   Skin:     General: Skin is warm.      Coloration: Skin is not jaundiced.   Neurological:      Mental Status: He is alert. Mental status is at baseline.   Psychiatric:         Mood and Affect: Mood normal.         Behavior: Behavior normal.       ASSESSMENT & PLAN     Problem List Items Addressed This Visit       Subacute pansinusitis - Primary    Current Assessment & Plan     Patient with evidence of acute sinusitis, additionally with cough.   - Will treat with first-line therapy of Augmentin BID x 7 days.   - Flonase daily each nostril x 14 days, but would recommend continuing for next month.   - Will send Tessalon TID PRN for cough.   - Will send albuterol inhaler for cough and tight on lung exam. Schedule q4h x 2 days, then space to q4h PRN after that. Demonstrated how to use inhaler today.   - Patient advised they can continue Tylenol for fever and discomfort.   - Discussed return precautions with patient, if they are not improving within 2-3 days. If patient requires antibiotic change, would transition to Levaquin at that time.   - amoxicillin-pot clavulanate (Augmentin) 875-125 mg tablet; Take 1 tablet (875 mg) by mouth 2 times a day for 10 days.  Dispense: 20 tablet; Refill: 0  - albuterol (ProAir HFA) 90 mcg/actuation inhaler; Inhale 2 puffs every 4 hours if needed for wheezing or shortness of breath.  Dispense: 8.5 g; Refill: 2  - fluticasone (Flonase) 50 mcg/actuation nasal spray; Administer 2 sprays into each nostril once daily at bedtime. Shake gently. Before first use, prime pump. After use, clean tip and replace cap.  Dispense: 16 g; Refill: 2  - benzonatate (Tessalon) 200 mg capsule; Take 1 capsule (200 mg) by mouth 3 times a day as needed for cough. Do not crush or chew.  Dispense: 42 capsule; Refill: 0         Relevant Medications    albuterol (ProAir HFA) 90 mcg/actuation inhaler    fluticasone (Flonase) 50 mcg/actuation nasal spray     benzonatate (Tessalon) 200 mg capsule     Other Visit Diagnoses       Subacute cough        Relevant Medications    benzonatate (Tessalon) 200 mg capsule            Level 3    Follow-Up Recommendations: PRN    Please excuse any typos or grammatical errors, part of this note was constructed with Dragon dictation software.    Dinora Hatch DO, MSEd  [facility]   Office: [phone]  1/7/2025 6:50 PM

## 2025-01-19 PROBLEM — J01.40 SUBACUTE PANSINUSITIS: Status: ACTIVE | Noted: 2025-01-19

## 2025-01-19 NOTE — ASSESSMENT & PLAN NOTE
Patient with evidence of acute sinusitis, additionally with cough.   - Will treat with first-line therapy of Augmentin BID x 7 days.   - Flonase daily each nostril x 14 days, but would recommend continuing for next month.   - Will send Tessalon TID PRN for cough.   - Will send albuterol inhaler for cough and tight on lung exam. Schedule q4h x 2 days, then space to q4h PRN after that. Demonstrated how to use inhaler today.   - Patient advised they can continue Tylenol for fever and discomfort.   - Discussed return precautions with patient, if they are not improving within 2-3 days. If patient requires antibiotic change, would transition to Levaquin at that time.   - amoxicillin-pot clavulanate (Augmentin) 875-125 mg tablet; Take 1 tablet (875 mg) by mouth 2 times a day for 10 days.  Dispense: 20 tablet; Refill: 0  - albuterol (ProAir HFA) 90 mcg/actuation inhaler; Inhale 2 puffs every 4 hours if needed for wheezing or shortness of breath.  Dispense: 8.5 g; Refill: 2  - fluticasone (Flonase) 50 mcg/actuation nasal spray; Administer 2 sprays into each nostril once daily at bedtime. Shake gently. Before first use, prime pump. After use, clean tip and replace cap.  Dispense: 16 g; Refill: 2  - benzonatate (Tessalon) 200 mg capsule; Take 1 capsule (200 mg) by mouth 3 times a day as needed for cough. Do not crush or chew.  Dispense: 42 capsule; Refill: 0

## 2025-03-12 ENCOUNTER — APPOINTMENT (OUTPATIENT)
Dept: PRIMARY CARE | Facility: CLINIC | Age: 75
End: 2025-03-12
Payer: MEDICARE

## 2025-03-12 VITALS
DIASTOLIC BLOOD PRESSURE: 73 MMHG | HEIGHT: 64 IN | WEIGHT: 196.5 LBS | TEMPERATURE: 97.4 F | SYSTOLIC BLOOD PRESSURE: 135 MMHG | RESPIRATION RATE: 12 BRPM | OXYGEN SATURATION: 93 % | BODY MASS INDEX: 33.55 KG/M2 | HEART RATE: 68 BPM

## 2025-03-12 DIAGNOSIS — R04.0 FREQUENT EPISTAXIS: ICD-10-CM

## 2025-03-12 DIAGNOSIS — E78.2 MIXED HYPERLIPIDEMIA: ICD-10-CM

## 2025-03-12 DIAGNOSIS — R73.03 PREDIABETES: ICD-10-CM

## 2025-03-12 DIAGNOSIS — Z95.1 S/P CABG X 4: Primary | Chronic | ICD-10-CM

## 2025-03-12 DIAGNOSIS — N52.9 ERECTILE DYSFUNCTION, UNSPECIFIED ERECTILE DYSFUNCTION TYPE: ICD-10-CM

## 2025-03-12 DIAGNOSIS — Z12.5 PROSTATE CANCER SCREENING: Primary | ICD-10-CM

## 2025-03-12 DIAGNOSIS — Z00.00 ROUTINE GENERAL MEDICAL EXAMINATION AT HEALTH CARE FACILITY: ICD-10-CM

## 2025-03-12 DIAGNOSIS — E78.2 MIXED HYPERLIPIDEMIA: Chronic | ICD-10-CM

## 2025-03-12 DIAGNOSIS — M48.062 SPINAL STENOSIS OF LUMBAR REGION WITH NEUROGENIC CLAUDICATION: ICD-10-CM

## 2025-03-12 DIAGNOSIS — G47.33 OBSTRUCTIVE SLEEP APNEA ON CPAP: Chronic | ICD-10-CM

## 2025-03-12 DIAGNOSIS — K21.9 GASTROESOPHAGEAL REFLUX DISEASE WITHOUT ESOPHAGITIS: ICD-10-CM

## 2025-03-12 DIAGNOSIS — I10 BENIGN ESSENTIAL HYPERTENSION: Chronic | ICD-10-CM

## 2025-03-12 DIAGNOSIS — I25.10 CORONARY ARTERY DISEASE INVOLVING NATIVE CORONARY ARTERY OF NATIVE HEART WITHOUT ANGINA PECTORIS: Chronic | ICD-10-CM

## 2025-03-12 DIAGNOSIS — Z00.00 MEDICARE ANNUAL WELLNESS VISIT, SUBSEQUENT: ICD-10-CM

## 2025-03-12 PROBLEM — I49.3 PVC (PREMATURE VENTRICULAR CONTRACTION): Status: RESOLVED | Noted: 2023-02-08 | Resolved: 2025-03-12

## 2025-03-12 PROBLEM — J01.40 SUBACUTE PANSINUSITIS: Status: RESOLVED | Noted: 2025-01-19 | Resolved: 2025-03-12

## 2025-03-12 PROCEDURE — 3008F BODY MASS INDEX DOCD: CPT | Performed by: FAMILY MEDICINE

## 2025-03-12 PROCEDURE — 1157F ADVNC CARE PLAN IN RCRD: CPT | Performed by: FAMILY MEDICINE

## 2025-03-12 PROCEDURE — 1160F RVW MEDS BY RX/DR IN RCRD: CPT | Performed by: FAMILY MEDICINE

## 2025-03-12 PROCEDURE — G0439 PPPS, SUBSEQ VISIT: HCPCS | Performed by: FAMILY MEDICINE

## 2025-03-12 PROCEDURE — 3075F SYST BP GE 130 - 139MM HG: CPT | Performed by: FAMILY MEDICINE

## 2025-03-12 PROCEDURE — 1036F TOBACCO NON-USER: CPT | Performed by: FAMILY MEDICINE

## 2025-03-12 PROCEDURE — 1170F FXNL STATUS ASSESSED: CPT | Performed by: FAMILY MEDICINE

## 2025-03-12 PROCEDURE — 1159F MED LIST DOCD IN RCRD: CPT | Performed by: FAMILY MEDICINE

## 2025-03-12 PROCEDURE — 3078F DIAST BP <80 MM HG: CPT | Performed by: FAMILY MEDICINE

## 2025-03-12 PROCEDURE — 1123F ACP DISCUSS/DSCN MKR DOCD: CPT | Performed by: FAMILY MEDICINE

## 2025-03-12 RX ORDER — LIDOCAINE 50 MG/G
1 PATCH TOPICAL DAILY
Qty: 30 PATCH | Refills: 0 | Status: SHIPPED | OUTPATIENT
Start: 2025-03-12 | End: 2026-03-12

## 2025-03-12 RX ORDER — SILDENAFIL 100 MG/1
50-100 TABLET, FILM COATED ORAL AS NEEDED
Qty: 10 TABLET | Refills: 5 | Status: SHIPPED | OUTPATIENT
Start: 2025-03-12

## 2025-03-12 RX ORDER — ROSUVASTATIN CALCIUM 20 MG/1
10 TABLET, COATED ORAL DAILY
Status: SHIPPED
Start: 2025-03-12

## 2025-03-12 ASSESSMENT — ENCOUNTER SYMPTOMS
NEUROLOGICAL NEGATIVE: 1
GASTROINTESTINAL NEGATIVE: 1
BACK PAIN: 1
APNEA: 1
CARDIOVASCULAR NEGATIVE: 1
PSYCHIATRIC NEGATIVE: 1

## 2025-03-12 ASSESSMENT — ACTIVITIES OF DAILY LIVING (ADL)
GROCERY_SHOPPING: INDEPENDENT
DOING_HOUSEWORK: INDEPENDENT
BATHING: INDEPENDENT
DRESSING: INDEPENDENT
TAKING_MEDICATION: INDEPENDENT
MANAGING_FINANCES: INDEPENDENT

## 2025-03-12 ASSESSMENT — PATIENT HEALTH QUESTIONNAIRE - PHQ9
SUM OF ALL RESPONSES TO PHQ9 QUESTIONS 1 AND 2: 0
2. FEELING DOWN, DEPRESSED OR HOPELESS: NOT AT ALL
1. LITTLE INTEREST OR PLEASURE IN DOING THINGS: NOT AT ALL

## 2025-03-12 NOTE — ASSESSMENT & PLAN NOTE
Most recent A1c:   Hemoglobin A1C (%)   Date Value   08/30/2024 6.1 (H)   Stable - 8/30/2023 was 6.1  Continue working on weight loss    You have pre-diabetic level blood sugar.  This means you are at risk for developing diabetes.  ;  I recommend you avoid processed carbohydrates and sugar containing foods/beverages.  If you must have a sweetened beverage, please use Stevia and avoid artificial sweeteners such as aspartame, sucralose, sweet n low, etc.;DRINK WATER!    A good diet plan to follow is the Mediterranean diet.  Use online search to read more about this.;    Another simple rule is to shop the grocery store perimeter -thus filling your cart with fresh fruits, vegetables, lean meats (fish, chicken); dairy, cheese, and nuts.  Avoid the isles where you find bagged and boxed items that are processed. (chips, crackers, cookies, candies, snacks, etc.);    Aerobic cardiovascular exercise 150min weekly is essential for good health, BP control, sugar metabolism, and weight management.;March 12, 2025

## 2025-03-12 NOTE — ASSESSMENT & PLAN NOTE
Follows with Dr. Dougherty - most recent appt 9/9/24:  1. CAD. 2019 he had bypass with a LIMA to the LAD, SVG to the diagonal, marginal, and PDA. Doing well. Exercise nuclear stress testing 4/17/2023 revealed average capacity for age. He had subtle EKG changes. No symptoms. Nuclear imaging entirely normal. Continue with aspirin, losartan, and rosuvastatin.     Next appt 9/15/25

## 2025-03-12 NOTE — PROGRESS NOTES
"Subjective   Reason for Visit: Troy Degroot is an 74 y.o. male here for a Medicare Wellness visit.     Reviewed all medications by prescribing practitioner or clinical pharmacist (such as prescriptions, OTCs, herbal therapies and supplements) and documented in the medical record.    He is doing well overall with no new concerns. He recently moved into a new home that is easier for him and his wife to get around in. He does still suffer from chronic back pain but it is not debilitating, he is able to manage on his own with lidocaine patches.   He is still wearing his CPAP every night. He also has started wearing carpal tunnel braces at night because he is starting to drop things during the day randomly. Carpal tunnel was an issue for him several years ago.         Patient Care Team:  Cheo Gleason MD as PCP - General  Cheo Gleason MD as PCP - [employer]vantage PCP     Review of Systems   HENT: Negative.     Respiratory:  Positive for apnea (CPAP use).    Cardiovascular: Negative.    Gastrointestinal: Negative.    Genitourinary: Negative.    Musculoskeletal:  Positive for back pain (chronic).   Neurological: Negative.    Psychiatric/Behavioral: Negative.         Objective   Vitals:  /73 (BP Location: Right arm, Patient Position: Sitting, BP Cuff Size: Large adult)   Pulse 68   Temp 36.3 °C (97.4 °F) (Temporal)   Resp 12   Ht 1.626 m (5' 4\")   Wt 89.1 kg (196 lb 8 oz)   SpO2 93%   BMI 33.73 kg/m²       Physical Exam  Constitutional:       Appearance: Normal appearance. He is obese.   HENT:      Head: Normocephalic.      Right Ear: Tympanic membrane normal.      Left Ear: Tympanic membrane normal.      Nose: Nose normal.      Mouth/Throat:      Mouth: Mucous membranes are moist.      Pharynx: Oropharynx is clear.   Eyes:      Conjunctiva/sclera: Conjunctivae normal.   Cardiovascular:      Rate and Rhythm: Normal rate and regular rhythm.      Pulses: Normal pulses.      Heart sounds: Normal " heart sounds.   Pulmonary:      Effort: Pulmonary effort is normal.      Breath sounds: Normal breath sounds.   Abdominal:      General: Abdomen is flat. Bowel sounds are normal.      Palpations: Abdomen is soft.   Musculoskeletal:         General: Normal range of motion.      Cervical back: Normal range of motion.   Skin:     General: Skin is warm and dry.   Neurological:      General: No focal deficit present.      Mental Status: He is alert and oriented to person, place, and time.   Psychiatric:         Mood and Affect: Mood normal.         Behavior: Behavior normal.         Thought Content: Thought content normal.         Judgment: Judgment normal.         Problem List Items Addressed This Visit       Benign essential hypertension (Chronic)     Blood pressure in office today:  BP Readings from Last 1 Encounters:   03/12/25 135/73   The goal range for blood pressure is below 130/80.   We will continue to monitor.   Continue losartan and chlorthalidone.         CAD (coronary artery disease) (Chronic)     Follows with Dr. Dougherty - most recent appt 9/9/24:  1. CAD. 2019 he had bypass with a LIMA to the LAD, SVG to the diagonal, marginal, and PDA. Doing well. Exercise nuclear stress testing 4/17/2023 revealed average capacity for age. He had subtle EKG changes. No symptoms. Nuclear imaging entirely normal. Continue with aspirin, losartan, and rosuvastatin.     Next appt 9/15/25         Relevant Medications    sildenafil (Viagra) 100 mg tablet    Erectile dysfunction     Continue viagra prn  Refill sent         Relevant Medications    sildenafil (Viagra) 100 mg tablet    Gastroesophageal reflux disease without esophagitis     Stable.  Continue famotidine          Mixed hyperlipidemia (Chronic)     Lab Results   Component Value Date    CHOL 130 08/30/2024    CHOL 133 08/30/2023    CHOL 136 09/15/2022     Lab Results   Component Value Date    HDL 44.9 08/30/2024    HDL 55.0 08/30/2023    HDL 50.1 09/15/2022     Lab  Results   Component Value Date    LDLCALC 50 08/30/2024     Lab Results   Component Value Date    TRIG 174 (H) 08/30/2024    TRIG 109 08/30/2023    TRIG 116 09/15/2022   Stable  Continue fish oil and rosuvastatin - only taking 1/2 statin daily         Relevant Medications    rosuvastatin (Crestor) 20 mg tablet    Obstructive sleep apnea on CPAP (Chronic)     Stable.  Continue CPAP nightly.         Prediabetes     Most recent A1c:   Hemoglobin A1C (%)   Date Value   08/30/2024 6.1 (H)   Stable - 8/30/2023 was 6.1  Continue working on weight loss    You have pre-diabetic level blood sugar.  This means you are at risk for developing diabetes.  ;  I recommend you avoid processed carbohydrates and sugar containing foods/beverages.  If you must have a sweetened beverage, please use Stevia and avoid artificial sweeteners such as aspartame, sucralose, sweet n low, etc.;DRINK WATER!    A good diet plan to follow is the Mediterranean diet.  Use online search to read more about this.;    Another simple rule is to shop the grocery store perimeter -thus filling your cart with fresh fruits, vegetables, lean meats (fish, chicken); dairy, cheese, and nuts.  Avoid the isles where you find bagged and boxed items that are processed. (chips, crackers, cookies, candies, snacks, etc.);    Aerobic cardiovascular exercise 150min weekly is essential for good health, BP control, sugar metabolism, and weight management.;March 12, 2025         S/P CABG x 4 - Primary (Chronic)     bypass April 2019 consisting of a LIMA to the LAD, SVG to the diagonal, marginal, and PDA.     Monitored by cardiolgy Dr Ladonna Monge  No anginal symptoms         Spinal stenosis of lumbar region with neurogenic claudication     Not life altering at this time, flares brought on dependent on posture.  Not ready to consider injections at this time.  lidocaine patches to be applied PRN         Relevant Medications    lidocaine (Lidoderm) 5 % patch    Medicare annual  wellness visit, subsequent                    Other Visit Diagnoses       Routine general medical examination at health care facility        Relevant Orders    1 Year Follow Up In [facility] - PCP - Wellness Exam          Follow up: Scheduled 9/18/2025 and 3/19/2026    Scribe Attestation  By signing my name below, ISavita Scribe   attest that this documentation has been prepared under the direction and in the presence of Cheo Gleason MD.

## 2025-03-12 NOTE — PROGRESS NOTES
"Subjective   Reason for Visit: Troy Degroot is an 74 y.o. male here for a Medicare Wellness visit.          Reviewed all medications by prescribing practitioner or clinical pharmacist (such as prescriptions, OTCs, herbal therapies and supplements) and documented in the medical record.    HPI    Patient Care Team:  Cheo Gleason MD as PCP - General  Cheo Gleason MD as PCP - [employer] Advantage PCP     Review of Systems    Objective   Vitals:  /73 (BP Location: Right arm, Patient Position: Sitting, BP Cuff Size: Large adult)   Pulse 68   Temp 36.3 °C (97.4 °F) (Temporal)   Resp 12   Ht 1.626 m (5' 4\")   Wt 89.1 kg (196 lb 8 oz)   SpO2 93%   BMI 33.73 kg/m²       Physical Exam    Assessment & Plan  S/P CABG x 4         Mixed hyperlipidemia    Orders:    rosuvastatin (Crestor) 20 mg tablet; Take 0.5 tablets (10 mg) by mouth once daily.    Coronary artery disease involving native coronary artery of native heart without angina pectoris         Benign essential hypertension         Prediabetes         Gastroesophageal reflux disease without esophagitis         Erectile dysfunction, unspecified erectile dysfunction type    Orders:    sildenafil (Viagra) 100 mg tablet; Take 0.5-1 tablets ( mg) by mouth if needed for erectile dysfunction. 1 hour before needed    Spinal stenosis of lumbar region with neurogenic claudication    Orders:    lidocaine (Lidoderm) 5 % patch; Place 1 patch over 12 hours on the skin once daily. Apply to painful area 12 hours per day, remove for 12 hours.    Obstructive sleep apnea on CPAP         Medicare annual wellness visit, subsequent         Routine general medical examination at health care facility    Orders:    1 Year Follow Up In [facility] - PCP - Wellness Exam; Future              "

## 2025-03-12 NOTE — ASSESSMENT & PLAN NOTE
Blood pressure in office today:  BP Readings from Last 1 Encounters:   03/12/25 135/73   The goal range for blood pressure is below 130/80.   We will continue to monitor.   Continue losartan and chlorthalidone.

## 2025-03-12 NOTE — ASSESSMENT & PLAN NOTE
Orders:    sildenafil (Viagra) 100 mg tablet; Take 0.5-1 tablets ( mg) by mouth if needed for erectile dysfunction. 1 hour before needed

## 2025-03-12 NOTE — ASSESSMENT & PLAN NOTE
Orders:    lidocaine (Lidoderm) 5 % patch; Place 1 patch over 12 hours on the skin once daily. Apply to painful area 12 hours per day, remove for 12 hours.

## 2025-03-12 NOTE — ASSESSMENT & PLAN NOTE
Not life altering at this time, flares brought on dependent on posture.  Not ready to consider injections at this time.  lidocaine patches to be applied PRN

## 2025-04-24 DIAGNOSIS — I10 BENIGN ESSENTIAL HYPERTENSION: ICD-10-CM

## 2025-04-24 DIAGNOSIS — E78.2 MIXED HYPERLIPIDEMIA: Chronic | ICD-10-CM

## 2025-04-24 DIAGNOSIS — K21.9 GASTROESOPHAGEAL REFLUX DISEASE WITHOUT ESOPHAGITIS: ICD-10-CM

## 2025-04-25 RX ORDER — FAMOTIDINE 40 MG/1
40 TABLET, FILM COATED ORAL DAILY
Qty: 90 TABLET | Refills: 1 | Status: SHIPPED | OUTPATIENT
Start: 2025-04-25

## 2025-04-25 RX ORDER — CHLORTHALIDONE 25 MG/1
25 TABLET ORAL DAILY
Qty: 90 TABLET | Refills: 1 | Status: SHIPPED | OUTPATIENT
Start: 2025-04-25

## 2025-04-25 RX ORDER — ROSUVASTATIN CALCIUM 20 MG/1
10 TABLET, COATED ORAL DAILY
Qty: 90 TABLET | Refills: 1 | Status: SHIPPED | OUTPATIENT
Start: 2025-04-25

## 2025-04-25 RX ORDER — LOSARTAN POTASSIUM 25 MG/1
25 TABLET ORAL DAILY
Qty: 90 TABLET | Refills: 1 | Status: SHIPPED | OUTPATIENT
Start: 2025-04-25

## 2025-09-18 ENCOUNTER — APPOINTMENT (OUTPATIENT)
Dept: PRIMARY CARE | Facility: CLINIC | Age: 75
End: 2025-09-18
Payer: MEDICARE

## 2025-10-09 ENCOUNTER — APPOINTMENT (OUTPATIENT)
Dept: PRIMARY CARE | Facility: CLINIC | Age: 75
End: 2025-10-09
Payer: MEDICARE

## 2026-03-19 ENCOUNTER — APPOINTMENT (OUTPATIENT)
Dept: PRIMARY CARE | Facility: CLINIC | Age: 76
End: 2026-03-19
Payer: MEDICARE